# Patient Record
Sex: FEMALE | Race: BLACK OR AFRICAN AMERICAN | Employment: UNEMPLOYED | URBAN - METROPOLITAN AREA
[De-identification: names, ages, dates, MRNs, and addresses within clinical notes are randomized per-mention and may not be internally consistent; named-entity substitution may affect disease eponyms.]

---

## 2018-03-12 ENCOUNTER — OFFICE VISIT (OUTPATIENT)
Dept: FAMILY MEDICINE CLINIC | Age: 33
End: 2018-03-12

## 2018-03-12 VITALS
SYSTOLIC BLOOD PRESSURE: 112 MMHG | TEMPERATURE: 98.7 F | BODY MASS INDEX: 21 KG/M2 | DIASTOLIC BLOOD PRESSURE: 73 MMHG | RESPIRATION RATE: 16 BRPM | HEART RATE: 79 BPM | OXYGEN SATURATION: 98 % | HEIGHT: 59 IN | WEIGHT: 104.2 LBS

## 2018-03-12 DIAGNOSIS — Z87.59 HISTORY OF PREGNANCY INDUCED HYPERTENSION: ICD-10-CM

## 2018-03-12 DIAGNOSIS — M54.6 MIDLINE THORACIC BACK PAIN, UNSPECIFIED CHRONICITY: Primary | ICD-10-CM

## 2018-03-12 NOTE — MR AVS SNAPSHOT
[facility] 
 
 
 [address] [place] [address] [address] [address] 
[phone] Patient: Mark Palacios MRN: [id number] FDI:[id number] Visit Information Date & Time Provider Department Dept. Phone Encounter #  
 3/12/2018 11:00 AM Lawrence Arana NP [place] [id number] Follow-up Instructions Return in about 4 weeks (around 4/9/2018) for Well woman exam with PAP/CBE. Upcoming Health Maintenance Date Due DTaP/Tdap/Td series (1 - Tdap) 6/15/2008 PAP AKA CERVICAL CYTOLOGY 6/15/2008 Allergies as of 3/12/2018  Review Complete On: 3/12/2018 By: Lawrence Arana NP Severity Noted Reaction Type Reactions Pcn [Penicillins]  03/06/2012    Hives Current Immunizations  Never Reviewed No immunizations on file. Not reviewed this visit You Were Diagnosed With   
  
 Codes Comments Midline thoracic back pain, unspecified chronicity    -  Primary ICD-10-CM: M54.6 ICD-9-CM: 724.1 History of pregnancy induced hypertension     ICD-10-CM: Z87.59 
ICD-9-CM: V13.29 Vitals BP Pulse Temp Resp Height(growth percentile) Weight(growth percentile) 112/73 (BP 1 Location: Left arm, BP Patient Position: Sitting) 79 98.7 °F (37.1 °C) (Oral) 16 4' 11\" (1.499 m) 104 lb 3.2 oz (47.3 kg) LMP SpO2 BMI OB Status Smoking Status 03/11/2018 98% 21.05 kg/m2 Unknown Never Smoker BMI and BSA Data Body Mass Index Body Surface Area 21.05 kg/m 2 1.4 m 2 Preferred Pharmacy Pharmacy Name Phone Jules Slice [address] [facility], [address] [place] [phone] Your Updated Medication List  
  
Notice  As of 3/12/2018 11:00 AM  
 You have not been prescribed any medications. Follow-up Instructions Return in about 4 weeks (around 4/9/2018) for Well woman exam with PAP/CBE. To-Do List   
 03/12/2018 Lab:  LIPID PANEL   
  
 03/12/2018 Lab: METABOLIC PANEL, COMPREHENSIVE Patient Instructions May take over the counter ibuprofen 2-3 tablets every 8 hours as needed for back pain High Blood Pressure in Pregnancy: Care Instructions Your Care Instructions High blood pressure (hypertension) means that the force of blood against your artery walls is too strong. Mild high blood pressure during pregnancy is not usually dangerous. Your doctor will probably just want to watch you closely. But when blood pressure is very high, it can reduce oxygen to your baby. This can affect how well your baby grows. High blood pressure also means that you are at higher risk for: · Preeclampsia. This is a problem that includes high blood pressure and damage to your liver or kidneys. It can also reduce how much oxygen your baby gets. In some cases, it leads to eclampsia. Eclampsia causes seizures. · Placental abruption. This is a problem when the placenta separates from the uterus before birth. It prevents the baby from getting enough oxygen and nutrients. Sometimes it can cause death for the baby and the mother. To prevent problems for you or your baby, you will have to check your blood pressure often. You will do this until after your baby is born. If your blood pressure rises suddenly or is very high during your pregnancy, your doctor may prescribe medicines. They can usually control blood pressure. If your blood pressure affects your or your baby's health, your doctor may need to deliver your baby early. After your baby is born, your blood pressure will probably improve. But sometimes blood pressure problems continue after birth. Follow-up care is a key part of your treatment and safety. Be sure to make and go to all appointments, and call your doctor if you are having problems. It's also a good idea to know your test results and keep a list of the medicines you take. How can you care for yourself at home? · Take and write down your blood pressure at home if your doctor tells you to. · Take your medicines exactly as prescribed. Call your doctor if you think you are having a problem with your medicine. · Do not smoke. If you need help quitting, talk to your doctor about stop-smoking programs and medicines. These can increase your chances of quitting for good. · Do not gain too much weight during your pregnancy. Talk to your doctor about how much weight gain is healthy. · Eat a healthy diet. · If your doctor says it's okay, get regular exercise. Walking or swimming several times a week can be healthy for you and your baby. · Reduce stress, and find time to relax. This is very important if you continue to work or have a busy schedule. It's also important if you have small children at home. When should you call for help? Call 911 anytime you think you may need emergency care. For example, call if: 
? · You passed out (lost consciousness). ? · You have a seizure. ?Call your doctor now or seek immediate medical care if: 
? · You have symptoms of preeclampsia, such as: 
¨ Sudden swelling of your face, hands, or feet. ¨ New vision problems (such as dimness or blurring). ¨ A severe headache. ? · Your blood pressure is higher than it should be or rises suddenly. ? · You have new nausea or vomiting. ? · You think that you are in labor. ? · You have pain in your belly or pelvis. ? Watch closely for changes in your health, and be sure to contact your doctor if: 
? · You gain weight rapidly. Where can you learn more? Go to http://james-arcelia.info/. Enter [phone] in the search box to learn more about \"High Blood Pressure in Pregnancy: Care Instructions. \" Current as of: March 16, 2017 Content Version: 11.4 © 5904-9993 [employer].  Care instructions adapted under license by [employer] (which disclaims liability or warranty for this information). If you have questions about a medical condition or this instruction, always ask your healthcare professional. [address] any warranty or liability for your use of this information. DASH Diet: Care Instructions Your Care Instructions The DASH diet is an eating plan that can help lower your blood pressure. DASH stands for Dietary Approaches to Stop Hypertension. Hypertension is high blood pressure. The DASH diet focuses on eating foods that are high in calcium, potassium, and magnesium. These nutrients can lower blood pressure. The foods that are highest in these nutrients are fruits, vegetables, low-fat dairy products, nuts, seeds, and legumes. But taking calcium, potassium, and magnesium supplements instead of eating foods that are high in those nutrients does not have the same effect. The DASH diet also includes whole grains, fish, and poultry. The DASH diet is one of several lifestyle changes your doctor may recommend to lower your high blood pressure. Your doctor may also want you to decrease the amount of sodium in your diet. Lowering sodium while following the DASH diet can lower blood pressure even further than just the DASH diet alone. Follow-up care is a key part of your treatment and safety. Be sure to make and go to all appointments, and call your doctor if you are having problems. It's also a good idea to know your test results and keep a list of the medicines you take. How can you care for yourself at home? Following the DASH diet · Eat 4 to 5 servings of fruit each day. A serving is 1 medium-sized piece of fruit, ½ cup chopped or canned fruit, 1/4 cup dried fruit, or 4 ounces (½ cup) of fruit juice. Choose fruit more often than fruit juice. · Eat 4 to 5 servings of vegetables each day.  A serving is 1 cup of lettuce or raw leafy vegetables, ½ cup of chopped or cooked vegetables, or 4 ounces (½ cup) of vegetable juice. Choose vegetables more often than vegetable juice. · Get 2 to 3 servings of low-fat and fat-free dairy each day. A serving is 8 ounces of milk, 1 cup of yogurt, or 1 ½ ounces of cheese. · Eat 6 to 8 servings of grains each day. A serving is 1 slice of bread, 1 ounce of dry cereal, or ½ cup of cooked rice, pasta, or cooked cereal. Try to choose whole-grain products as much as possible. · Limit lean meat, poultry, and fish to 2 servings each day. A serving is 3 ounces, about the size of a deck of cards. · Eat 4 to 5 servings of nuts, seeds, and legumes (cooked dried beans, lentils, and split peas) each week. A serving is 1/3 cup of nuts, 2 tablespoons of seeds, or ½ cup of cooked beans or peas. · Limit fats and oils to 2 to 3 servings each day. A serving is 1 teaspoon of vegetable oil or 2 tablespoons of salad dressing. · Limit sweets and added sugars to 5 servings or less a week. A serving is 1 tablespoon jelly or jam, ½ cup sorbet, or 1 cup of lemonade. · Eat less than 2,300 milligrams (mg) of sodium a day. If you limit your sodium to 1,500 mg a day, you can lower your blood pressure even more. Tips for success · Start small. Do not try to make dramatic changes to your diet all at once. You might feel that you are missing out on your favorite foods and then be more likely to not follow the plan. Make small changes, and stick with them. Once those changes become habit, add a few more changes. · Try some of the following: ¨ Make it a goal to eat a fruit or vegetable at every meal and at snacks. This will make it easy to get the recommended amount of fruits and vegetables each day. ¨ Try yogurt topped with fruit and nuts for a snack or healthy dessert. ¨ Add lettuce, tomato, cucumber, and onion to sandwiches. ¨ Combine a ready-made pizza crust with low-fat mozzarella cheese and lots of vegetable toppings.  Try using tomatoes, squash, spinach, broccoli, carrots, cauliflower, and onions. ¨ Have a variety of cut-up vegetables with a low-fat dip as an appetizer instead of chips and dip. ¨ Sprinkle sunflower seeds or chopped almonds over salads. Or try adding chopped walnuts or almonds to cooked vegetables. ¨ Try some vegetarian meals using beans and peas. Add garbanzo or kidney beans to salads. Make burritos and tacos with mashed russell beans or black beans. Where can you learn more? Go to http://james"[employer]arcelia.info/. Enter K389 in the search box to learn more about \"DASH Diet: Care Instructions. \" Current as of: September 21, 2016 Content Version: 11.4 © 2341-9683 [employer]. Care instructions adapted under license by [employer] (which disclaims liability or warranty for this information). If you have questions about a medical condition or this instruction, always ask your healthcare professional. [place] [address] any warranty or liability for your use of this information. Introducing [facility]! Melisas Weathers introduces Barnacle patient portal. Now you can access parts of your medical record, email your doctor's office, and request medication refills online. 1. In your internet browser, go to [email or web address] 2. Click on the First Time User? Click Here link in the Sign In box. You will see the New Member Sign Up page. 3. Enter your Barnacle Access Code exactly as it appears below. You will not need to use this code after youve completed the sign-up process. If you do not sign up before the expiration date, you must request a new code. · Barnacle Access Code: [email or web address] Expires: 6/10/2018 11:00 AM 
 
4. Enter the last four digits of your Social Security Number (xxxx) and Date of Birth (mm/dd/yyyy) as indicated and click Submit. You will be taken to the next sign-up page. 5. Create a Barnacle ID.  This will be your Barnacle login ID and cannot be changed, so think of one that is secure and easy to remember. 6. Create a Branded Reality password. You can change your password at any time. 7. Enter your Password Reset Question and Answer. This can be used at a later time if you forget your password. 8. Enter your e-mail address. You will receive e-mail notification when new information is available in [address] 9. Click Sign Up. You can now view and download portions of your medical record. 10. Click the Download Summary menu link to download a portable copy of your medical information. If you have questions, please visit the Frequently Asked Questions section of the Branded Reality website. Remember, Branded Reality is NOT to be used for urgent needs. For medical emergencies, dial 911. Now available from your iPhone and Android! Please provide this summary of care documentation to your next provider. Your primary care clinician is listed as NONE. If you have any questions after today's visit, please call [phone].

## 2018-03-12 NOTE — PROGRESS NOTES
HISTORY OF PRESENT ILLNESS  Jeanette Mobley is a 29 y.o. female. HPI Comments: Presents today establish care. Reports she does have history of lower back pain at her epideral site. Reports pain is intermittent, currently denies. Physical   The history is provided by the patient. This is a chronic problem. Episode frequency: intermittently. Pertinent negatives include no chest pain, no abdominal pain, no headaches and no shortness of breath. Nothing aggravates the symptoms. Relieved by: taking over the counter ibuprofen prn with some relief. The treatment provided mild relief. Cardiovascular Review:  The patient has history of PIH and also states she has intermittent back pain at the site of her epidural.  Diet and Lifestyle: not attempting to follow a low sodium diet, sedentary  Home BP Monitoring: is not measured at home. Pertinent ROS: no TIA's, no chest pain on exertion, no dyspnea on exertion, no swelling of ankles, currently not taking any medication. Patient Active Problem List   Diagnosis Code    PIH (pregnancy induced hypertension) O13.9     Patient Active Problem List    Diagnosis Date Noted    PIH (pregnancy induced hypertension) 2013       Allergies   Allergen Reactions    Pcn [Penicillins] Hives     History reviewed. No pertinent past medical history.   Past Surgical History:   Procedure Laterality Date    HX GYN           Family History   Problem Relation Age of Onset    Asthma Paternal Aunt     Asthma Paternal Grandmother     Hypertension Mother     Hypertension Father      Social History   Substance Use Topics    Smoking status: Never Smoker    Smokeless tobacco: Never Used    Alcohol use No      Lab Results  Component Value Date/Time   WBC 6.2 2016 02:20 AM   HGB 13.0 2016 02:20 AM   HCT 37.8 2016 02:20 AM   PLATELET 489 3140 02:20 AM   MCV 78.1 2016 02:20 AM     Lab Results   Component Value Date/Time    Sodium 139 2016 02:20 AM    Potassium 3.7 02/03/2016 02:20 AM    Chloride 106 02/03/2016 02:20 AM    CO2 25 02/03/2016 02:20 AM    Anion gap 8 02/03/2016 02:20 AM    Glucose 97 02/03/2016 02:20 AM    BUN 14 02/03/2016 02:20 AM    Creatinine 0.68 02/03/2016 02:20 AM    BUN/Creatinine ratio 21 (H) 02/03/2016 02:20 AM    GFR est AA >60 02/03/2016 02:20 AM    GFR est non-AA >60 02/03/2016 02:20 AM    Calcium 9.1 02/03/2016 02:20 AM    Bilirubin, total 0.7 02/03/2016 02:20 AM    ALT (SGPT) 23 02/03/2016 02:20 AM    AST (SGOT) 15 02/03/2016 02:20 AM    Alk. phosphatase 95 02/03/2016 02:20 AM    Protein, total 8.0 02/03/2016 02:20 AM    Albumin 3.7 02/03/2016 02:20 AM    Globulin 4.3 (H) 02/03/2016 02:20 AM    A-G Ratio 0.9 02/03/2016 02:20 AM            Review of Systems   Constitutional: Negative for chills and fever. HENT: Negative. Eyes: Negative for blurred vision. Respiratory: Negative for cough, shortness of breath and wheezing. Cardiovascular: Negative for chest pain, palpitations and leg swelling. Gastrointestinal: Negative for abdominal pain and nausea. Genitourinary: Negative for dysuria, frequency and urgency. Musculoskeletal: Positive for back pain (lower back). Skin: Negative for itching and rash. Neurological: Negative for dizziness and headaches. Psychiatric/Behavioral: Negative for depression. The patient is not nervous/anxious. Visit Vitals    /73 (BP 1 Location: Left arm, BP Patient Position: Sitting)    Pulse 79    Temp 98.7 °F (37.1 °C) (Oral)    Resp 16    Ht 4' 11\" (1.499 m)    Wt 104 lb 3.2 oz (47.3 kg)    LMP 03/11/2018    SpO2 98%    BMI 21.05 kg/m2     Physical Exam   Constitutional: She is oriented to person, place, and time. She appears well-developed and well-nourished. HENT:   Head: Normocephalic and atraumatic. Eyes: EOM are normal. Pupils are equal, round, and reactive to light. Neck: Normal range of motion. Neck supple.    Cardiovascular: Normal rate, regular rhythm, normal heart sounds and intact distal pulses. Exam reveals no gallop and no friction rub. No murmur heard. Pulmonary/Chest: Effort normal and breath sounds normal. She has no wheezes. She has no rales. Abdominal: Soft. Bowel sounds are normal. She exhibits no distension. There is no tenderness. Musculoskeletal: Normal range of motion. She exhibits no edema or tenderness. Lymphadenopathy:     She has no cervical adenopathy. Neurological: She is alert and oriented to person, place, and time. She has normal reflexes. Skin: Skin is warm and dry. No rash noted. No erythema. Psychiatric: She has a normal mood and affect. Her behavior is normal. Thought content normal.   Vitals reviewed. ASSESSMENT and PLAN  Diagnoses and all orders for this visit:    1. Midline thoracic back pain, unspecified chronicity    2. History of pregnancy induced hypertension  -     METABOLIC PANEL, COMPREHENSIVE; Future  -     LIPID PANEL; Future    May use heat to back in 15-20 min increments to help with pain and also use otc ibuprofen 2-3 tabs three times a day as needed for back pain  I have discussed the diagnosis with the patient and the intended plan as seen in the above orders. The patient has received an after-visit summary and questions were answered concerning future plans. I have discussed medication side effects and warnings with the patient as well. Patient agreeable with above plan and verbalizes understanding. Follow-up Disposition:  Return in about 4 weeks (around 4/9/2018) for Well woman exam with PAP/CBE.

## 2018-03-12 NOTE — PATIENT INSTRUCTIONS
May take over the counter ibuprofen 2-3 tablets every 8 hours as needed for back pain  High Blood Pressure in Pregnancy: Care Instructions  Your Care Instructions    High blood pressure (hypertension) means that the force of blood against your artery walls is too strong. Mild high blood pressure during pregnancy is not usually dangerous. Your doctor will probably just want to watch you closely. But when blood pressure is very high, it can reduce oxygen to your baby. This can affect how well your baby grows. High blood pressure also means that you are at higher risk for:  · Preeclampsia. This is a problem that includes high blood pressure and damage to your liver or kidneys. It can also reduce how much oxygen your baby gets. In some cases, it leads to eclampsia. Eclampsia causes seizures. · Placental abruption. This is a problem when the placenta separates from the uterus before birth. It prevents the baby from getting enough oxygen and nutrients. Sometimes it can cause death for the baby and the mother. To prevent problems for you or your baby, you will have to check your blood pressure often. You will do this until after your baby is born. If your blood pressure rises suddenly or is very high during your pregnancy, your doctor may prescribe medicines. They can usually control blood pressure. If your blood pressure affects your or your baby's health, your doctor may need to deliver your baby early. After your baby is born, your blood pressure will probably improve. But sometimes blood pressure problems continue after birth. Follow-up care is a key part of your treatment and safety. Be sure to make and go to all appointments, and call your doctor if you are having problems. It's also a good idea to know your test results and keep a list of the medicines you take. How can you care for yourself at home? · Take and write down your blood pressure at home if your doctor tells you to.   · Take your medicines exactly as prescribed. Call your doctor if you think you are having a problem with your medicine. · Do not smoke. If you need help quitting, talk to your doctor about stop-smoking programs and medicines. These can increase your chances of quitting for good. · Do not gain too much weight during your pregnancy. Talk to your doctor about how much weight gain is healthy. · Eat a healthy diet. · If your doctor says it's okay, get regular exercise. Walking or swimming several times a week can be healthy for you and your baby. · Reduce stress, and find time to relax. This is very important if you continue to work or have a busy schedule. It's also important if you have small children at home. When should you call for help? Call 911 anytime you think you may need emergency care. For example, call if:  ? · You passed out (lost consciousness). ? · You have a seizure. ?Call your doctor now or seek immediate medical care if:  ? · You have symptoms of preeclampsia, such as:  ¨ Sudden swelling of your face, hands, or feet. ¨ New vision problems (such as dimness or blurring). ¨ A severe headache. ? · Your blood pressure is higher than it should be or rises suddenly. ? · You have new nausea or vomiting. ? · You think that you are in labor. ? · You have pain in your belly or pelvis. ? Watch closely for changes in your health, and be sure to contact your doctor if:  ? · You gain weight rapidly. Where can you learn more? Go to http://james-arcelia.info/. Enter [phone] in the search box to learn more about \"High Blood Pressure in Pregnancy: Care Instructions. \"  Current as of: March 16, 2017  Content Version: 11.4  © 1730-2979 [employer]. Care instructions adapted under license by [employer] (which disclaims liability or warranty for this information).  If you have questions about a medical condition or this instruction, always ask your healthcare professional. [employer], [facility] disclaims any warranty or liability for your use of this information. DASH Diet: Care Instructions  Your Care Instructions    The DASH diet is an eating plan that can help lower your blood pressure. DASH stands for Dietary Approaches to Stop Hypertension. Hypertension is high blood pressure. The DASH diet focuses on eating foods that are high in calcium, potassium, and magnesium. These nutrients can lower blood pressure. The foods that are highest in these nutrients are fruits, vegetables, low-fat dairy products, nuts, seeds, and legumes. But taking calcium, potassium, and magnesium supplements instead of eating foods that are high in those nutrients does not have the same effect. The DASH diet also includes whole grains, fish, and poultry. The DASH diet is one of several lifestyle changes your doctor may recommend to lower your high blood pressure. Your doctor may also want you to decrease the amount of sodium in your diet. Lowering sodium while following the DASH diet can lower blood pressure even further than just the DASH diet alone. Follow-up care is a key part of your treatment and safety. Be sure to make and go to all appointments, and call your doctor if you are having problems. It's also a good idea to know your test results and keep a list of the medicines you take. How can you care for yourself at home? Following the DASH diet  · Eat 4 to 5 servings of fruit each day. A serving is 1 medium-sized piece of fruit, ½ cup chopped or canned fruit, 1/4 cup dried fruit, or 4 ounces (½ cup) of fruit juice. Choose fruit more often than fruit juice. · Eat 4 to 5 servings of vegetables each day. A serving is 1 cup of lettuce or raw leafy vegetables, ½ cup of chopped or cooked vegetables, or 4 ounces (½ cup) of vegetable juice. Choose vegetables more often than vegetable juice. · Get 2 to 3 servings of low-fat and fat-free dairy each day.  A serving is 8 ounces of milk, 1 cup of yogurt, or 1 ½ ounces of cheese. · Eat 6 to 8 servings of grains each day. A serving is 1 slice of bread, 1 ounce of dry cereal, or ½ cup of cooked rice, pasta, or cooked cereal. Try to choose whole-grain products as much as possible. · Limit lean meat, poultry, and fish to 2 servings each day. A serving is 3 ounces, about the size of a deck of cards. · Eat 4 to 5 servings of nuts, seeds, and legumes (cooked dried beans, lentils, and split peas) each week. A serving is 1/3 cup of nuts, 2 tablespoons of seeds, or ½ cup of cooked beans or peas. · Limit fats and oils to 2 to 3 servings each day. A serving is 1 teaspoon of vegetable oil or 2 tablespoons of salad dressing. · Limit sweets and added sugars to 5 servings or less a week. A serving is 1 tablespoon jelly or jam, ½ cup sorbet, or 1 cup of lemonade. · Eat less than 2,300 milligrams (mg) of sodium a day. If you limit your sodium to 1,500 mg a day, you can lower your blood pressure even more. Tips for success  · Start small. Do not try to make dramatic changes to your diet all at once. You might feel that you are missing out on your favorite foods and then be more likely to not follow the plan. Make small changes, and stick with them. Once those changes become habit, add a few more changes. · Try some of the following:  ¨ Make it a goal to eat a fruit or vegetable at every meal and at snacks. This will make it easy to get the recommended amount of fruits and vegetables each day. ¨ Try yogurt topped with fruit and nuts for a snack or healthy dessert. ¨ Add lettuce, tomato, cucumber, and onion to sandwiches. ¨ Combine a ready-made pizza crust with low-fat mozzarella cheese and lots of vegetable toppings. Try using tomatoes, squash, spinach, broccoli, carrots, cauliflower, and onions. ¨ Have a variety of cut-up vegetables with a low-fat dip as an appetizer instead of chips and dip. ¨ Sprinkle sunflower seeds or chopped almonds over salads.  Or try adding chopped walnuts or almonds to cooked vegetables. ¨ Try some vegetarian meals using beans and peas. Add garbanzo or kidney beans to salads. Make burritos and tacos with mashed russell beans or black beans. Where can you learn more? Go to http://james-arcelia.info/. Enter T401 in the search box to learn more about \"DASH Diet: Care Instructions. \"  Current as of: September 21, 2016  Content Version: 11.4  © 0649-7481 [employer]. Care instructions adapted under license by [employer] (which disclaims liability or warranty for this information). If you have questions about a medical condition or this instruction, always ask your healthcare professional. [address] any warranty or liability for your use of this information.

## 2018-03-12 NOTE — PROGRESS NOTES
Chief Complaint   Patient presents with   [address] [facility] [address] Hypertension     Patient is here as a New patient. Pt sts she has been having back pain as well.

## 2018-03-13 LAB
A-G RATIO,AGRAT: 1.3 RATIO (ref 1.1–2.6)
ALBUMIN SERPL-MCNC: 4.4 G/DL (ref 3.5–5)
ALP SERPL-CCNC: 82 U/L (ref 25–115)
ALT SERPL-CCNC: 20 U/L (ref 5–40)
ANION GAP SERPL CALC-SCNC: 18 MMOL/L
AST SERPL W P-5'-P-CCNC: 25 U/L (ref 10–37)
BILIRUB SERPL-MCNC: 0.5 MG/DL (ref 0.2–1.2)
BUN SERPL-MCNC: 13 MG/DL (ref 6–22)
CALCIUM SERPL-MCNC: 9.2 MG/DL (ref 8.4–10.5)
CHLORIDE SERPL-SCNC: 104 MMOL/L (ref 98–110)
CHOLEST SERPL-MCNC: 196 MG/DL (ref 110–200)
CO2 SERPL-SCNC: 20 MMOL/L (ref 20–32)
CREAT SERPL-MCNC: 0.5 MG/DL (ref 0.5–1.2)
GFRAA, 66117: >60
GFRNA, 66118: >60
GLOBULIN,GLOB: 3.4 G/DL (ref 2–4)
GLUCOSE SERPL-MCNC: 75 MG/DL (ref 70–99)
HDLC SERPL-MCNC: 2.7 MG/DL (ref 0–5)
HDLC SERPL-MCNC: 72 MG/DL (ref 40–59)
LDLC SERPL CALC-MCNC: 114 MG/DL (ref 50–99)
POTASSIUM SERPL-SCNC: 4.3 MMOL/L (ref 3.5–5.5)
PROT SERPL-MCNC: 7.8 G/DL (ref 6.4–8.3)
SODIUM SERPL-SCNC: 142 MMOL/L (ref 133–145)
TRIGL SERPL-MCNC: 51 MG/DL (ref 40–149)
VLDLC SERPL CALC-MCNC: 10 MG/DL (ref 8–30)

## 2018-04-05 NOTE — PROGRESS NOTES
Please advise patient her LDL(bad cholesterol) is slightly elevated. She will need to decrease high cholesterol/fatty food intake.   All other labs are normal.  Thanks, [facility]

## 2018-04-06 NOTE — PROGRESS NOTES
I called Pt in regards to Lab results. Informed Pt that her LDL was slightly elevated and she will need to Decrease Foods high in fat and cholesterol. Pt verbalized understanding.

## 2018-06-06 ENCOUNTER — HOSPITAL ENCOUNTER (EMERGENCY)
Age: 33
Discharge: HOME | End: 2018-06-06
Attending: EMERGENCY MEDICINE
Payer: MEDICAID

## 2018-06-06 ENCOUNTER — APPOINTMENT (OUTPATIENT)
Dept: CT IMAGING | Age: 33
End: 2018-06-06
Attending: PHYSICIAN ASSISTANT
Payer: MEDICAID

## 2018-06-06 VITALS
SYSTOLIC BLOOD PRESSURE: 124 MMHG | RESPIRATION RATE: 18 BRPM | TEMPERATURE: 100.2 F | HEART RATE: 97 BPM | DIASTOLIC BLOOD PRESSURE: 95 MMHG | OXYGEN SATURATION: 99 %
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
## 2018-06-06 DIAGNOSIS — S00.511A LIP ABRASION, INITIAL ENCOUNTER: Primary | ICD-10-CM

## 2018-06-06 DIAGNOSIS — S09.90XA INJURY OF HEAD, INITIAL ENCOUNTER: ICD-10-CM

## 2018-06-06 PROCEDURE — 99283 EMERGENCY DEPT VISIT LOW MDM: CPT

## 2018-06-06 PROCEDURE — 70450 CT HEAD/BRAIN W/O DYE: CPT

## 2018-06-06 RX ORDER — IBUPROFEN 800 MG/1
800 TABLET ORAL
Qty: 20 TAB | Refills: 0 | Status: SHIPPED | OUTPATIENT
Start: 2018-06-06 | End: 2018-06-13

## 2018-06-06 RX ORDER — METHOCARBAMOL 500 MG/1
500 TABLET, FILM COATED ORAL 4 TIMES DAILY
Qty: 12 TAB | Refills: 0 | Status: SHIPPED | OUTPATIENT
Start: 2018-06-06 | End: 2019-03-20
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
## 2019-03-20 ENCOUNTER — OFFICE VISIT (OUTPATIENT)
Dept: FAMILY MEDICINE CLINIC | Age: 34
End: 2019-03-20

## 2019-03-20 VITALS
SYSTOLIC BLOOD PRESSURE: 130 MMHG | WEIGHT: 99 LBS | RESPIRATION RATE: 16 BRPM | BODY MASS INDEX: 19.96 KG/M2 | OXYGEN SATURATION: 99 % | TEMPERATURE: 98.1 F | HEIGHT: 59 IN | HEART RATE: 85 BPM | DIASTOLIC BLOOD PRESSURE: 80 MMHG

## 2019-03-20 DIAGNOSIS — R07.89 ATYPICAL CHEST PAIN: Primary | ICD-10-CM

## 2019-03-20 RX ORDER — NAPROXEN 500 MG/1
TABLET ORAL
Qty: 20 TAB | Refills: 0 | Status: SHIPPED | OUTPATIENT
Start: 2019-03-20

## 2019-03-20 NOTE — PROGRESS NOTES
Chief Complaint Patient presents with  Chest Pain Pt preferred language for health care discussion is english. Is someone accompanying this pt? no 
 
Is the patient using any DME equipment during [address]? no 
 
Depression Screening: 
3 most recent PHQ Screens 3/12/2018 Little interest or pleasure in doing things Not at all Feeling down, depressed, irritable, or hopeless Not at all Total Score PHQ 2 0 Learning Assessment: 
Learning Assessment 3/12/2018 6/24/2014 PRIMARY LEARNER Patient Patient HIGHEST LEVEL OF EDUCATION - PRIMARY LEARNER  GRADUATED HIGH SCHOOL OR GED GRADUATED HIGH SCHOOL OR GED  
BARRIERS PRIMARY LEARNER - NONE PRIMARY LANGUAGE ENGLISH ENGLISH  
LEARNER PREFERENCE PRIMARY READING READING  
ANSWERED BY SELF patient RELATIONSHIP SELF SELF Health Maintenance reviewed and discussed per provider. Yes Advance Directive: 1. Do you have an advance directive in place? Patient Reply:no 2. If not, would you like material regarding how to put one in place? Patient Reply: no 
 
Coordination of Care: 1. Have you been to the ER, urgent care clinic since your last visit? Hospitalized since your last visit? no 
 
2. Have you seen or consulted any other health care providers outside of the [address] [place] [address] since your last visit? Include any pap smears or colon screening. no 
 
Provider prefers to do his own med reconciliation

## 2019-03-20 NOTE — PROGRESS NOTES
HPI:  
3-4 mos of fleeting nonexertional chest pain particularly when bending, moving arms; episodes last seconds w/o cough, fever, abd pain LMP now ROS is otherwise negative. History reviewed. No pertinent past medical history. Past Surgical History:  
Procedure Laterality Date  HX GYN    Social History Socioeconomic History  Marital status: SINGLE Spouse name: Not on file  Number of children: Not on file  Years of education: Not on file  Highest education level: Not on file Occupational History  Not on file Social Needs  Financial resource strain: Not on file  Food insecurity:  
  Worry: Not on file Inability: Not on file  Transportation needs:  
  Medical: Not on file Non-medical: Not on file Tobacco Use  Smoking status: Never Smoker  Smokeless tobacco: Never Used Substance and Sexual Activity  Alcohol use: No  
 Drug use: No  
 Sexual activity: Yes Birth control/protection: None Lifestyle  Physical activity:  
  Days per week: Not on file Minutes per session: Not on file  Stress: Not on file Relationships  Social connections:  
  Talks on phone: Not on file Gets together: Not on file Attends Gnosticist service: Not on file Active member of club or organization: Not on file Attends meetings of clubs or organizations: Not on file Relationship status: Not on file  Intimate partner violence:  
  Fear of current or ex partner: Not on file Emotionally abused: Not on file Physically abused: Not on file Forced sexual activity: Not on file Other Topics Concern  Not on file Social History Narrative  Not on file Allergies Allergen Reactions  Pcn [Penicillins] Hives Family History Problem Relation Age of Onset  Asthma Paternal Aunt  Asthma Paternal Grandmother  Hypertension Mother  Hypertension Father Meds: none Visit Vitals /80 (BP 1 Location: Right arm, BP Patient Position: Sitting) Pulse 85 Temp 98.1 °F (36.7 °C) (Tympanic) Resp 16 Ht 4' 11\" (1.499 m) Wt 99 lb (44.9 kg) SpO2 99% BMI 20.00 kg/m² PE Well nourished in NAD HEENT:  OP: clear. Neck: supple w/o mass Chest: clear. +Reproducible pain (B) anterior chest wall CV: RRR w/o m,r,g; pulses intact. Abd: soft, NT, w/o HSM or mass. Ext: w/o edema. Neuro: NF. Assessment and Plan Encounter Diagnoses Name Primary?  Atypical chest pain Yes [facility] CP - naprosyn 500 mg bid with food for 10 days; f/u worsening or unimproved 7 days OV 3 mos or prn I have explained plan to patient and the patient verbalizes understanding

## 2019-03-20 NOTE — PATIENT INSTRUCTIONS
Musculoskeletal Chest Pain: Care Instructions Your Care Instructions Chest pain is not always a sign that something is wrong with your heart or that you have another serious problem. The doctor thinks your chest pain is caused by strained muscles or ligaments, inflamed chest cartilage, or another problem in your chest, rather than by your heart. You may need more tests to find the cause of your chest pain. Follow-up care is a key part of your treatment and safety. Be sure to make and go to all appointments, and call your doctor if you are having problems. It's also a good idea to know your test results and keep a list of the medicines you take. How can you care for yourself at home? · Take pain medicines exactly as directed. ? If the doctor gave you a prescription medicine for pain, take it as prescribed. ? If you are not taking a prescription pain medicine, ask your doctor if you can take an over-the-counter medicine. · Rest and protect the sore area. · Stop, change, or take a break from any activity that may be causing your pain or soreness. · Put ice or a cold pack on the sore area for 10 to 20 minutes at a time. Try to do this every 1 to 2 hours for the next 3 days (when you are awake) or until the swelling goes down. Put a thin cloth between the ice and your skin. · After 2 or 3 days, apply a heating pad set on low or a warm cloth to the area that hurts. Some doctors suggest that you go back and forth between hot and cold. · Do not wrap or tape your ribs for support. This may cause you to take smaller breaths, which could increase your risk of lung problems. · Mentholated creams such as Bengay or Icy Hot may soothe sore muscles. Follow the instructions on the package. · Follow your doctor's instructions for exercising. · Gentle stretching and massage may help you get better faster.  Stretch slowly to the point just before pain begins, and hold the stretch for at least 15 to 30 seconds. Do this 3 or 4 times a day. Stretch just after you have applied heat. · As your pain gets better, slowly return to your normal activities. Any increased pain may be a sign that you need to rest a while longer. When should you call for help? Call 911 anytime you think you may need emergency care. For example, call if: 
  · You have chest pain or pressure. This may occur with: ? Sweating. ? Shortness of breath. ? Nausea or vomiting. ? Pain that spreads from the chest to the neck, jaw, or one or both shoulders or arms. ? Dizziness or lightheadedness. ? A fast or uneven pulse. After calling 911, chew 1 adult-strength aspirin. Wait for an ambulance. Do not try to drive yourself.  
  · You have sudden chest pain and shortness of breath, or you cough up blood.  
 Call your doctor now or seek immediate medical care if: 
  · You have any trouble breathing.  
  · Your chest pain gets worse.  
  · Your chest pain occurs consistently with exercise and is relieved by rest.  
 Watch closely for changes in your health, and be sure to contact your doctor if: 
  · Your chest pain does not get better after 1 week. Where can you learn more? Go to http://james-arcelia.info/. Enter V293 in the search box to learn more about \"Musculoskeletal Chest Pain: Care Instructions. \" Current as of: September 23, 2018 Content Version: 11.9 © 5689-5222 [employer]. Care instructions adapted under license by [employer] (which disclaims liability or warranty for this information). If you have questions about a medical condition or this instruction, always ask your healthcare professional. [place] [address] any warranty or liability for your use of this information.

## 2019-10-22 ENCOUNTER — HOSPITAL ENCOUNTER (EMERGENCY)
Age: 34
Discharge: HOME | End: 2019-10-22
Attending: EMERGENCY MEDICINE
Payer: MEDICAID

## 2019-10-22 ENCOUNTER — APPOINTMENT (OUTPATIENT)
Dept: CT IMAGING | Age: 34
End: 2019-10-22
Attending: EMERGENCY MEDICINE
Payer: MEDICAID

## 2019-10-22 VITALS
OXYGEN SATURATION: 100 % | RESPIRATION RATE: 18 BRPM | SYSTOLIC BLOOD PRESSURE: 124 MMHG | DIASTOLIC BLOOD PRESSURE: 81 MMHG | TEMPERATURE: 99 F | HEART RATE: 86 BPM

## 2019-10-22 DIAGNOSIS — R51.9 ACUTE NONINTRACTABLE HEADACHE, UNSPECIFIED HEADACHE TYPE: Primary | ICD-10-CM

## 2019-10-22 LAB
ALBUMIN SERPL-MCNC: 3.6 G/DL (ref 3.4–5)
ALBUMIN/GLOB SERPL: 0.9 {RATIO} (ref 0.8–1.7)
ALP SERPL-CCNC: 92 U/L (ref 45–117)
ALT SERPL-CCNC: 36 U/L (ref 13–56)
ANION GAP SERPL CALC-SCNC: 4 MMOL/L (ref 3–18)
AST SERPL-CCNC: 32 U/L (ref 10–38)
BASOPHILS # BLD: 0 K/UL (ref 0–0.1)
BASOPHILS NFR BLD: 1 % (ref 0–2)
BILIRUB SERPL-MCNC: 0.6 MG/DL (ref 0.2–1)
BUN SERPL-MCNC: 10 MG/DL (ref 7–18)
BUN/CREAT SERPL: 14 (ref 12–20)
CALCIUM SERPL-MCNC: 8.6 MG/DL (ref 8.5–10.1)
CHLORIDE SERPL-SCNC: 109 MMOL/L (ref 100–111)
CO2 SERPL-SCNC: 27 MMOL/L (ref 21–32)
CREAT SERPL-MCNC: 0.7 MG/DL (ref 0.6–1.3)
DIFFERENTIAL METHOD BLD: ABNORMAL
EOSINOPHIL # BLD: 0.5 K/UL (ref 0–0.4)
EOSINOPHIL NFR BLD: 7 % (ref 0–5)
ERYTHROCYTE [DISTWIDTH] IN BLOOD BY AUTOMATED COUNT: 13 % (ref 11.6–14.5)
GLOBULIN SER CALC-MCNC: 4.1 G/DL (ref 2–4)
GLUCOSE SERPL-MCNC: 94 MG/DL (ref 74–99)
HCG SERPL QL: NEGATIVE
HCT VFR BLD AUTO: 37 % (ref 35–45)
HGB BLD-MCNC: 12.6 G/DL (ref 12–16)
LYMPHOCYTES # BLD: 2 K/UL (ref 0.9–3.6)
LYMPHOCYTES NFR BLD: 32 % (ref 21–52)
MCH RBC QN AUTO: 27.6 PG (ref 24–34)
MCHC RBC AUTO-ENTMCNC: 34.1 G/DL (ref 31–37)
MCV RBC AUTO: 81.1 FL (ref 74–97)
MONOCYTES # BLD: 0.5 K/UL (ref 0.05–1.2)
MONOCYTES NFR BLD: 8 % (ref 3–10)
NEUTS SEG # BLD: 3.4 K/UL (ref 1.8–8)
NEUTS SEG NFR BLD: 52 % (ref 40–73)
PLATELET # BLD AUTO: 300 K/UL (ref 135–420)
PMV BLD AUTO: 9.9 FL (ref 9.2–11.8)
POTASSIUM SERPL-SCNC: 3.6 MMOL/L (ref 3.5–5.5)
PROT SERPL-MCNC: 7.7 G/DL (ref 6.4–8.2)
RBC # BLD AUTO: 4.56 M/UL (ref 4.2–5.3)
SODIUM SERPL-SCNC: 140 MMOL/L (ref 136–145)
WBC # BLD AUTO: 6.5 K/UL (ref 4.6–13.2)

## 2019-10-22 PROCEDURE — 74011250637 HC RX REV CODE- 250/637: Performed by: EMERGENCY MEDICINE

## 2019-10-22 PROCEDURE — 99284 EMERGENCY DEPT VISIT MOD MDM: CPT

## 2019-10-22 PROCEDURE — 85025 COMPLETE CBC W/AUTO DIFF WBC: CPT

## 2019-10-22 PROCEDURE — 74011250636 HC RX REV CODE- 250/636: Performed by: EMERGENCY MEDICINE

## 2019-10-22 PROCEDURE — 96374 THER/PROPH/DIAG INJ IV PUSH: CPT

## 2019-10-22 PROCEDURE — 93005 ELECTROCARDIOGRAM TRACING: CPT

## 2019-10-22 PROCEDURE — 84703 CHORIONIC GONADOTROPIN ASSAY: CPT

## 2019-10-22 PROCEDURE — 70450 CT HEAD/BRAIN W/O DYE: CPT

## 2019-10-22 PROCEDURE — 80053 COMPREHEN METABOLIC PANEL: CPT

## 2019-10-22 PROCEDURE — 96375 TX/PRO/DX INJ NEW DRUG ADDON: CPT

## 2019-10-22 RX ORDER — PROCHLORPERAZINE EDISYLATE 5 MG/ML
10 INJECTION INTRAMUSCULAR; INTRAVENOUS
Status: COMPLETED | OUTPATIENT
Start: 2019-10-22 | End: 2019-10-22

## 2019-10-22 RX ORDER — DIPHENHYDRAMINE HYDROCHLORIDE 50 MG/ML
12.5 INJECTION, SOLUTION INTRAMUSCULAR; INTRAVENOUS
Status: COMPLETED | OUTPATIENT
Start: 2019-10-22 | End: 2019-10-22

## 2019-10-22 RX ADMIN — DIPHENHYDRAMINE HYDROCHLORIDE 12.5 MG: 50 INJECTION INTRAMUSCULAR; INTRAVENOUS at 20:57

## 2019-10-22 RX ADMIN — SODIUM CHLORIDE 500 ML: 900 INJECTION, SOLUTION INTRAVENOUS at 20:58

## 2019-10-22 RX ADMIN — ACETAMINOPHEN ORAL SOLUTION 650 MG: 650 SOLUTION ORAL at 20:57

## 2019-10-22 RX ADMIN — PROCHLORPERAZINE EDISYLATE 10 MG: 5 INJECTION INTRAMUSCULAR; INTRAVENOUS at 20:57

## 2019-10-22 NOTE — ED TRIAGE NOTES
Patient brought by medic. Patient c/o of headache and chest pain. She reports taking 160mg of tylenol for menstrual cramps and developed a headache.  She reports chest pain under left breast.

## 2019-10-23 LAB
ATRIAL RATE: 87 BPM
CALCULATED P AXIS, ECG09: 44 DEGREES
CALCULATED R AXIS, ECG10: 73 DEGREES
CALCULATED T AXIS, ECG11: 45 DEGREES
DIAGNOSIS, 93000: NORMAL
P-R INTERVAL, ECG05: 138 MS
Q-T INTERVAL, ECG07: 334 MS
QRS DURATION, ECG06: 72 MS
QTC CALCULATION (BEZET), ECG08: 401 MS
VENTRICULAR RATE, ECG03: 87 BPM

## 2019-10-23 NOTE — ED NOTES
I have reviewed discharge instructions with the patient. The patient verbalized understanding. Patient ambulated out of ER w/o distress. Steady gait noted.

## 2019-10-23 NOTE — ED PROVIDER NOTES
EMERGENCY DEPARTMENT HISTORY AND PHYSICAL EXAM    9:41 PM      Date: 10/22/2019  Patient Name: Charli Riley    History of Presenting Illness     Chief Complaint   Patient presents with    Headache    Chest Pain         History Provided By: Patient    Additional History (Context): Charli Riley is a 30 y.o. female with no relevant past medical history who presents with complaint of acute onset worst headache of her life after taking Advil for menstrual cramps. She states she is on her period, which is her usual time, there are no irregularities to this, but she took Advil for her cramps shortly after this her headache started. It is a generalized, sharp headache, without associated vision changes, neck stiffness, nausea, vomiting or any other associated symptoms. She is never had a headache like this before. PCP: None    Current Facility-Administered Medications   Medication Dose Route Frequency Provider Last Rate Last Dose    sodium chloride 0.9 % bolus infusion 500 mL  500 mL IntraVENous ONCE Viridiana Domínguez  mL/hr at 10/22/19 2058 500 mL at 10/22/19 2058     Current Outpatient Medications   Medication Sig Dispense Refill    naproxen (NAPROSYN) 500 mg tablet 1 bid with food for 10 days 20 Tab 0       Past History     Past Medical History:  History reviewed. No pertinent past medical history. Past Surgical History:  Past Surgical History:   Procedure Laterality Date    HX GYN             Family History:  Family History   Problem Relation Age of Onset    Asthma Paternal Aunt     Asthma Paternal [de-identified]     Hypertension Mother     Hypertension Father        Social History:  Social History     Tobacco Use    Smoking status: Never Smoker    Smokeless tobacco: Never Used   Substance Use Topics    Alcohol use: No    Drug use: No       Allergies:   Allergies   Allergen Reactions    Pcn [Penicillins] Hives         Review of Systems       Review of Systems   Constitutional: Negative for activity change and appetite change. HENT: Negative for congestion. Eyes: Negative for visual disturbance. Respiratory: Negative for cough and shortness of breath. Cardiovascular: Negative for chest pain. Gastrointestinal: Negative for abdominal pain, diarrhea, nausea and vomiting. Genitourinary: Negative for dysuria. Musculoskeletal: Negative for arthralgias and myalgias. Skin: Negative for rash. Neurological: Positive for headaches. Negative for weakness and numbness. Physical Exam     Visit Vitals  /81   Pulse 86   Temp 99 °F (37.2 °C)   Resp 18   SpO2 100%         Physical Exam   Constitutional: She is oriented to person, place, and time. She appears well-developed and well-nourished. HENT:   Head: Normocephalic and atraumatic. Mouth/Throat: Oropharynx is clear and moist.   Eyes: Conjunctivae are normal.   Neck: Normal range of motion. Neck supple. No JVD present. Cardiovascular: Normal rate, regular rhythm, normal heart sounds and intact distal pulses. No murmur heard. Pulmonary/Chest: Effort normal and breath sounds normal.   Abdominal: Soft. Bowel sounds are normal. She exhibits no distension. There is no tenderness. Musculoskeletal: Normal range of motion. She exhibits no deformity. Lymphadenopathy:     She has no cervical adenopathy. Neurological: She is alert and oriented to person, place, and time. She has normal strength. No cranial nerve deficit or sensory deficit. Coordination normal.   Skin: Skin is warm and dry. No rash noted. Psychiatric: She has a normal mood and affect. Nursing note and vitals reviewed.         Diagnostic Study Results     Labs -  Recent Results (from the past 12 hour(s))   EKG, 12 LEAD, INITIAL    Collection Time: 10/22/19  7:21 PM   Result Value Ref Range    Ventricular Rate 87 BPM    Atrial Rate 87 BPM    P-R Interval 138 ms    QRS Duration 72 ms    Q-T Interval 334 ms    QTC Calculation (Bezet) 401 ms Calculated P Axis 44 degrees    Calculated R Axis 73 degrees    Calculated T Axis 45 degrees    Diagnosis       Normal sinus rhythm  Normal ECG  No previous ECGs available     CBC WITH AUTOMATED DIFF    Collection Time: 10/22/19  8:32 PM   Result Value Ref Range    WBC 6.5 4.6 - 13.2 K/uL    RBC 4.56 4.20 - 5.30 M/uL    HGB 12.6 12.0 - 16.0 g/dL    HCT 37.0 35.0 - 45.0 %    MCV 81.1 74.0 - 97.0 FL    MCH 27.6 24.0 - 34.0 PG    MCHC 34.1 31.0 - 37.0 g/dL    RDW 13.0 11.6 - 14.5 %    PLATELET 773 056 - 618 K/uL    MPV 9.9 9.2 - 11.8 FL    NEUTROPHILS 52 40 - 73 %    LYMPHOCYTES 32 21 - 52 %    MONOCYTES 8 3 - 10 %    EOSINOPHILS 7 (H) 0 - 5 %    BASOPHILS 1 0 - 2 %    ABS. NEUTROPHILS 3.4 1.8 - 8.0 K/UL    ABS. LYMPHOCYTES 2.0 0.9 - 3.6 K/UL    ABS. MONOCYTES 0.5 0.05 - 1.2 K/UL    ABS. EOSINOPHILS 0.5 (H) 0.0 - 0.4 K/UL    ABS. BASOPHILS 0.0 0.0 - 0.1 K/UL    DF AUTOMATED     METABOLIC PANEL, COMPREHENSIVE    Collection Time: 10/22/19  8:32 PM   Result Value Ref Range    Sodium 140 136 - 145 mmol/L    Potassium 3.6 3.5 - 5.5 mmol/L    Chloride 109 100 - 111 mmol/L    CO2 27 21 - 32 mmol/L    Anion gap 4 3.0 - 18 mmol/L    Glucose 94 74 - 99 mg/dL    BUN 10 7.0 - 18 MG/DL    Creatinine 0.70 0.6 - 1.3 MG/DL    BUN/Creatinine ratio 14 12 - 20      GFR est AA >60 >60 ml/min/1.73m2    GFR est non-AA >60 >60 ml/min/1.73m2    Calcium 8.6 8.5 - 10.1 MG/DL    Bilirubin, total PENDING MG/DL    ALT (SGPT) 36 13 - 56 U/L    AST (SGOT) 32 10 - 38 U/L    Alk. phosphatase 92 45 - 117 U/L    Protein, total 7.7 6.4 - 8.2 g/dL    Albumin 3.6 3.4 - 5.0 g/dL    Globulin 4.1 (H) 2.0 - 4.0 g/dL    A-G Ratio 0.9 0.8 - 1.7     HCG QL SERUM    Collection Time: 10/22/19  8:32 PM   Result Value Ref Range    HCG, Ql. NEGATIVE  NEG         Radiologic Studies -   CT HEAD WO CONT   Final Result   IMPRESSION:                  No acute intracranial abnormalities.                           Medical Decision Making   I am the first provider for this patient. I reviewed the vital signs, available nursing notes, past medical history, past surgical history, family history and social history. Vital Signs-Reviewed the patient's vital signs. Records Reviewed: Nursing Notes (Time of Review: 9:41 PM)      Provider Notes (Medical Decision Making):    Davina Gamble is a 30 y.o. female with no relevant past medical history who presents with complaint of acute onset worst headache of her life after taking Advil for menstrual cramps. She states she is on her period, which is her usual time, there are no irregularities to this, but she took Advil for her cramps shortly after this her headache started. It is a generalized, sharp headache, without associated vision changes, neck stiffness, nausea, vomiting or any other associated symptoms. She is never had a headache like this before. She is neurologically intact and otherwise well-appearing. Differential Diagnosis: Rule out subarachnoid hemorrhage given her presentation, however likely primary headache such as migraine, tension, or cluster headache. Given history and exam, I have low suspicion for medication related, hypertension related, stroke, cerebral venous thrombosis, cervical artery dissection, idiopathic intracranial hypertension, infection/meningitis, neoplasm, giant cell arteritis, traumatic injury, glaucoma or other primary ocular disorder. Testing: CT brain, CBC, CMP, hCG  Treatments: Compazine, Benadryl, acetaminophen    Re-evaluations:  Outpatient studies are unremarkable. Discussed at length the need to obtain a lumbar puncture to accurately rule out subarachnoid hemorrhage. The patient has been informed that subarachnoid hemorrhage is on my differential, and that I recommend lumbar puncture as is standard of care; however, they are refusing LP at this time. I believe they understand the risks of refusing this diagnostic test, and are competent to refuse.         Diagnosis     Clinical Impression:   1. Acute nonintractable headache, unspecified headache type        Disposition: Discharge    Follow-up Information    None          Patient's Medications   Start Taking    No medications on file   Continue Taking    NAPROXEN (NAPROSYN) 500 MG TABLET    1 bid with food for 10 days   These Medications have changed    No medications on file   Stop Taking    No medications on file     _______________________________    Attestations:  Isis Hurtado MD acting as a scribe for and in the presence of Feng Mena MD      October 22, 2019 at 9:44 PM       Provider Attestation:      I personally performed the services described in the documentation, reviewed the documentation, as recorded by the scribe in my presence, and it accurately and completely records my words and actions.  October 22, 2019 at 9:44 PM - Feng Mena MD    _______________________________

## 2019-10-23 NOTE — DISCHARGE INSTRUCTIONS

## 2019-10-24 ENCOUNTER — OFFICE VISIT (OUTPATIENT)
Dept: FAMILY MEDICINE CLINIC | Age: 34
End: 2019-10-24

## 2019-10-24 VITALS
TEMPERATURE: 98.5 F | HEIGHT: 59 IN | DIASTOLIC BLOOD PRESSURE: 81 MMHG | WEIGHT: 98 LBS | BODY MASS INDEX: 19.76 KG/M2 | OXYGEN SATURATION: 99 % | SYSTOLIC BLOOD PRESSURE: 125 MMHG | RESPIRATION RATE: 17 BRPM | HEART RATE: 99 BPM

## 2019-10-24 DIAGNOSIS — L60.8 TOENAIL DEFORMITY: Primary | ICD-10-CM

## 2019-10-24 NOTE — PROGRESS NOTES
Ramon Avitia presents today for   Chief Complaint   Patient presents with    Nail Problem       Is someone accompanying this pt? no    Is the patient using any DME equipment during OV? no    Depression Screening:  3 most recent PHQ Screens 3/20/2019   Little interest or pleasure in doing things Not at all   Feeling down, depressed, irritable, or hopeless Not at all   Total Score PHQ 2 0       Learning Assessment:  Learning Assessment 3/12/2018   PRIMARY LEARNER Patient   HIGHEST LEVEL OF EDUCATION - PRIMARY LEARNER  GRADUATED HIGH SCHOOL OR GED   BARRIERS PRIMARY LEARNER -   PRIMARY LANGUAGE ENGLISH   LEARNER PREFERENCE PRIMARY READING   ANSWERED BY SELF   RELATIONSHIP SELF       Abuse Screening:  Abuse Screening Questionnaire 3/12/2018   Do you ever feel afraid of your partner? Y   Are you in a relationship with someone who physically or mentally threatens you? N   Is it safe for you to go home? N       Fall Risk  No flowsheet data found. Health Maintenance reviewed and discussed and ordered per Provider. Health Maintenance Due   Topic Date Due    DTaP/Tdap/Td series (1 - Tdap) 06/15/2008    PAP AKA CERVICAL CYTOLOGY  06/15/2008    Influenza Age 9 to Adult  08/01/2019           Coordination of Care:  1. Have you been to the ER, urgent care clinic since your last visit? Hospitalized since your last visit? no    2. Have you seen or consulted any other health care providers outside of the [address] [place] [address] since your last visit? Include any pap smears or colon screening.  no

## 2019-10-24 NOTE — PROGRESS NOTES
HISTORY OF PRESENT ILLNESS  Thierno Yao is a 30 y.o. female. Nail Problem   The history is provided by the patient. This is a chronic (dropped something on her foot years ago) problem. Episode onset: 3-4 years ago. The problem has not changed since onset. Associated symptoms comments: Bilateral great toenail deformity. Nothing aggravates the symptoms. Nothing relieves the symptoms. She has tried nothing for the symptoms. Allergies   Allergen Reactions    Pcn [Penicillins] Hives     Current Outpatient Medications   Medication Sig Dispense Refill    naproxen (NAPROSYN) 500 mg tablet 1 bid with food for 10 days 20 Tab 0     No past medical history on file.   Social History     Socioeconomic History    Marital status: SINGLE     Spouse name: Not on file    Number of children: Not on file    Years of education: Not on file    Highest education level: Not on file   Occupational History    Not on file   Social Needs    Financial resource strain: Not on file    Food insecurity:     Worry: Not on file     Inability: Not on file    Transportation needs:     Medical: Not on file     Non-medical: Not on file   Tobacco Use    Smoking status: Never Smoker    Smokeless tobacco: Never Used   Substance and Sexual Activity    Alcohol use: No    Drug use: No    Sexual activity: Yes     Birth control/protection: None   Lifestyle    Physical activity:     Days per week: Not on file     Minutes per session: Not on file    Stress: Not on file   Relationships    Social connections:     Talks on phone: Not on file     Gets together: Not on file     Attends Gnosticism service: Not on file     Active member of club or organization: Not on file     Attends meetings of clubs or organizations: Not on file     Relationship status: Not on file    Intimate partner violence:     Fear of current or ex partner: Not on file     Emotionally abused: Not on file     Physically abused: Not on file     Forced sexual activity: Not on file   Other Topics Concern    Not on file   Social History Narrative    Not on file     Wt Readings from Last 3 Encounters:   10/24/19 98 lb (44.5 kg)   03/20/19 99 lb (44.9 kg)   03/12/18 104 lb 3.2 oz (47.3 kg)     BP Readings from Last 3 Encounters:   10/24/19 125/81   10/22/19 124/81   03/20/19 130/80     Review of Systems   Skin:        Toenail avulsion, bilaterally     /81   Pulse 99   Temp 98.5 °F (36.9 °C) (Oral)   Resp 17   Ht 4' 11\" (1.499 m)   Wt 98 lb (44.5 kg)   LMP 10/24/2019   SpO2 99%   Breastfeeding? No   BMI 19.79 kg/m²      Physical Exam   Constitutional: She is oriented to person, place, and time. She appears well-developed and well-nourished. HENT:   Head: Normocephalic and atraumatic. Neck: Normal range of motion. Neck supple. Cardiovascular: Normal rate, regular rhythm and normal heart sounds. Exam reveals no gallop and no friction rub. No murmur heard. Pulmonary/Chest: Effort normal and breath sounds normal. She has no wheezes. She has no rhonchi. She has no rales. Musculoskeletal:        Right foot: There is deformity (great toenail). Left foot: There is deformity (great toenail). Neurological: She is alert and oriented to person, place, and time. Skin: Skin is warm and dry. ASSESSMENT and PLAN    ICD-10-CM ICD-9-CM    1. Toenail deformity L60.8 703.9 REFERRAL TO PODIATRY     Orders Placed This Encounter    REFERRAL TO PODIATRY       I have discussed the diagnosis with the patient and the intended plan as seen in the above orders. The patient has received an after-visit summary and questions were answered concerning future plans. I have discussed medication side effects and warnings with the patient as well. Patient agreeable with above plan and verbalizes understanding. Follow-up and Dispositions    · Return if symptoms worsen or fail to improve.

## 2021-08-19 ENCOUNTER — TRANSCRIBE ORDER (OUTPATIENT)
Dept: REGISTRATION | Age: 36
End: 2021-08-19

## 2021-08-19 ENCOUNTER — HOSPITAL ENCOUNTER (OUTPATIENT)
Dept: GENERAL RADIOLOGY | Age: 36
Discharge: HOME | End: 2021-08-19
Payer: MEDICAID

## 2021-08-19 DIAGNOSIS — R07.89 OTHER CHEST PAIN: ICD-10-CM

## 2021-08-19 DIAGNOSIS — R07.89 OTHER CHEST PAIN: Primary | ICD-10-CM

## 2021-08-19 PROCEDURE — 71101 X-RAY EXAM UNILAT RIBS/CHEST: CPT

## 2022-03-24 ENCOUNTER — HOSPITAL ENCOUNTER (OUTPATIENT)
Dept: PHYSICAL THERAPY | Age: 37
Discharge: HOME | End: 2022-03-24
Payer: MEDICAID

## 2022-03-24 PROCEDURE — 97161 PT EVAL LOW COMPLEX 20 MIN: CPT
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
## 2022-03-24 NOTE — PROGRESS NOTES
PT DAILY TREATMENT NOTE/LUMBAR EVAL     Patient Name: Lawson Mccormick  Date:3/24/2022  : 1985  [x]  Patient  Verified  Payor: [facility] MEDICAID / [place] 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 Pt. Reports pain began about a year ago. Reports no known onset. Reports pain occurs randomly can just happen just sitting there or walking. X-rays were negative. Also went to ED to rule out heart/lung issues. Denies SOB. No pain with deep breathing or coughing. 10/10 at worst for pain. Topical medication helps some. Work Hx: not currently working  Living Situation: Ind with ADLs.  Playing with daughter   Pt Goals: to have less pain    OBJECTIVE/EXAMINATION      Modality rationale: decrease inflammation and decrease pain to improve the patients ability to increase ease of ADLs   Min Type Additional Details    [] Estim:  []Unatt       []IFC  []Premod                        []Other:  []w/ice   []w/heat  Position:  Location:    [] Estim: []Att    []TENS instruct  []NMES                    []Other:  []w/US   []w/ice   []w/heat  Position:  Location:    []  Traction: [] Cervical       []Lumbar                       [] Prone          []Supine                       []Intermittent   []Continuous Lbs:  [] before manual  [] after manual    []  Ultrasound: []Continuous   [] Pulsed []1MHz   []3MHz Location:  W/cm2:    []  Iontophoresis with dexamethasone         Location: [] Take home patch   [] In clinic   8 [x]  Ice     []  heat  []  Ice massage  []  Laser   []  Anodyne Position: right side lying  Location: left ribs    []  Laser with stim  []  Other: Position:  Location:    []  Vasopneumatic Device Pressure:       [] lo [] med [] hi   Temperature: [] lo [] med [] hi   [x] Skin assessment post-treatment:  [x]intact []redness- no adverse reaction    []redness  adverse reaction:     8 min Therapeutic Exercise:  [] See flow sheet : HEP   Rationale: increase ROM and increase strength to improve the patients ability to perform ADLs    5 min Manual Therapy:  KT for left rib compression    The manual therapy interventions were performed at a separate and distinct time from the therapeutic activities interventions. Rationale: decrease pain, increase ROM and increase tissue extensibility to increase ease of ADLs          With   [x] TE   [] TA   [] neuro   [] other: Patient Education: [x] Review HEP    [] Progressed/Changed HEP based on:   [] positioning   [] body mechanics   [] transfers   [] heat/ice application    [] other:      Physical Therapy Evaluation - Lumbar Spine ([facility])    OBJECTIVE  Posture:  Left ribs elevated. Side lean to right side.      Gait:  [x] Normal     [] Abnormal:    Active Movements: [] N/A   [] Too acute   [] Other:  ROM % AROM % PROM Comments:pain, area   Forward flexion 40-60 100  Pain on the way back up   Extension 20-30 75  Increased pain   SB right 20-30 75  Increased pain   SB left 20-30 75  Increased pain   Rotation right 5-10 75  Increased pain   Rotation left 5-10 75  Increased pain     Palpation  [] Min  [] Mod  [x] Severe    Location: significant pain to light touch along left mid ribs  [] Min  [] Mod  [] Severe    Location:  [] Min  [] Mod  [] Severe    Location:    Other tests/comments:  Shoulder MMT flex: right: 4+/5 left: 4-/5 abd right: 4+/5 left: 4/5  Challenged with performing diaphragmatic breathing  Pt.  Sits with lean to right side       Pain Level (0-10 scale) post treatment:  3/10    ASSESSMENT/Changes in Function:    [x]  See Plan of Care  []  See progress note/recertification  []  See Discharge Summary         Progress towards goals / Updated goals:  See POC    PLAN  []  Upgrade activities as tolerated     [x]  Continue plan of care  []  Update interventions per flow sheet       []  Discharge due to:_  []  Other:_      Kem Baeza, PT 3/24/2022  11:58 AM

## 2022-04-04 ENCOUNTER — APPOINTMENT (OUTPATIENT)
Dept: PHYSICAL THERAPY | Age: 37
End: 2022-04-04

## 2022-04-07 ENCOUNTER — APPOINTMENT (OUTPATIENT)
Dept: PHYSICAL THERAPY | Age: 37
End: 2022-04-07

## 2022-04-11 ENCOUNTER — TELEPHONE (OUTPATIENT)
Dept: PHYSICAL THERAPY | Age: 37
End: 2022-04-11

## 2022-04-14 NOTE — PROGRESS NOTES
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]  Payor: [facility] MEDICAID / Plan: [address] [place] [address] / Product Type: Managed Care Medicaid /  Onset Date: a year ago                Treatment Diagnosis: thoracic pain   Prior Hospitalization: see medical history Provider#: [id number]   Medications: Verified on Patient summary List    Comorbidities: none reported   Prior Level of Function: Ind with ADLs, Ind with ambulation, taking care of daughter           Visits from Start of Care: 1    Missed Visits: 3    Reporting Period : 3/24/22 to 22    Summary of Care:  Goal: Patient will improve FOTO score by 10 points in order to demonstrate a significant improvement in function. Status at last note/certification: 58  Status at discharge: not met    Goal: Patient will report a 50% improvement in symptoms since [facility] in order to improve quality of life. Status at last note/certification: n/a  Status at discharge: not met    Goal: Patient will improve left shoulder flex/abd MMT to 4+/5 in order to increase ease of ADLs  Status at last note/certification: flex: 4-/5 abd: 4/5  Status at discharge: not met    Goal: Patient will lift 20# with good from and no increase in pain in order to improve quality of life. Status at last note/certification: increased pain with lifting  Status at discharge: not met    Pt. Was seen for initial evaluation and then did not return to PT. Goals were unable to be re-assessed secondary to unplanned D/C.      ASSESSMENT/RECOMMENDATIONS:  [x]Discontinue therapy: []Patient has reached or is progressing toward set goals      [x]Patient is non-compliant or has abdicated      []Due to lack of appreciable progress towards set goals    Kem Adas, PT 4/14/2022 5:44 PM

## 2022-06-23 ENCOUNTER — HOSPITAL ENCOUNTER (OUTPATIENT)
Dept: GENERAL RADIOLOGY | Age: 37
Discharge: HOME | End: 2022-06-23
Payer: MEDICAID

## 2022-06-23 ENCOUNTER — TRANSCRIBE ORDER (OUTPATIENT)
Dept: REGISTRATION | Age: 37
End: 2022-06-23

## 2022-06-23 DIAGNOSIS — M54.9 BACK PAIN: Primary | ICD-10-CM

## 2022-06-23 DIAGNOSIS — M54.9 BACK PAIN: ICD-10-CM

## 2022-06-23 PROCEDURE — 72070 X-RAY EXAM THORAC SPINE 2VWS: CPT

## 2022-07-28 ENCOUNTER — HOSPITAL ENCOUNTER (OUTPATIENT)
Dept: PHYSICAL THERAPY | Age: 37
Discharge: HOME | End: 2022-07-28
Payer: MEDICAID

## 2022-07-28 PROCEDURE — 97161 PT EVAL LOW COMPLEX 20 MIN: CPT

## 2022-07-28 NOTE — THERAPY EVALUATION
PT DAILY TREATMENT NOTE/LUMBAR EVAL     Patient Name: Jalyn Luis  Date:2022  : 1985  [x]  Patient  Verified  Payor: [facility] MEDICAID / Plan: [address] [place] [address] / Product Type: Managed Care Medicaid /    In time: 8:16  Out time: 8:49  Total Treatment Time (min): 33  Visit #: 1 of     Medicare/BCBS Only   Total Timed Codes (min):  10 1:1 Treatment Time:  33     Treatment Area: Other low back pain [M54.59]  SUBJECTIVE  Pain Level (0-10 scale): 3/10  []constant []intermittent []improving []worsening []no change since onset    Any medication changes, allergies to medications, adverse drug reactions, diagnosis change, or new procedure performed?: [x] No    [] Yes (see summary sheet for update)  Subjective functional status/changes:     PLOF: ind with all mobility, Walking the mall 2 days/week, taking care of her kid at home  Limitations to PLOF:   Mechanism of Injury:   Current symptoms/Complaints: Ms. Mandi Shaver is a 31 y/o, F pt with CC of Left back & left plank pain. Pain is intermittent; no numbness/tingling and no radiating symptoms with B LEs. Pt also denies any pelvic floor problems. She recalls negative findings with X-ray.         Previous Treatment/Compliance:   PMHx/Surgical Hx:   Work Hx:   Living Situation:   Pt Goals: for the pain to leave  Barriers: []pain []financial []time []transportation []other  Motivation:   Substance use: []Alcohol []Tobacco []other:   FABQ Score: []low []elevate  Cognition: A & O x     Other:    OBJECTIVE/EXAMINATION  Domestic Life:   Activity/Recreational Limitations:   Mobility: Self Care:       23 min [x]Eval                  []Re-Eval       10 min Therapeutic Activity:  []  See flow sheet :Pt edu within scope of practice on prognosis, POC, posture, STM, pain management/modalities use, HEP   Rationale: increase ROM, increase strength, improve coordination, improve balance, and increase proprioception  to improve the patients ability to perform aDLs/amb with more ease           With   [] TE   [] TA   [] neuro   [] other: Patient Education: [x] Review HEP    [] Progressed/Changed HEP based on:   [] positioning   [] body mechanics   [] transfers   [] heat/ice application    [] other:      Other Objective/Functional Measures:     Physical Therapy Evaluation - Lumbar Spine ([facility])    SUBJECTIVE  Chief Complaint:    Mechanism of injury:    Symptoms:  Aggravated by: lifitng   [] Bending [] Sitting [] Standing [] Walking   [] Moving [] Cough [] Sneeze [] Valsalva   [] AM  [] PM  Lying:  [] sup   [] pro   [] sidelying   [] Other:     Eased by: pain med   [] Bending [] Sitting [] Standing [] Walking   [] Moving [] AM  [] PM  Lying: [] sup  [] pro  [] sidelying   [] Other:     General Health:  Red Flags Indicated? [] Yes    [x] No  [] Yes [] No Recent weight change (If yes, due to dieting?  [] Yes  [] No)   [] Yes [] No Weakness in legs during walking  [] Yes [] No Unremitting pain at night  [] Yes [] No Abdominal pain or problems  [] Yes [] No Rectal bleeding  [] Yes [] No Feet more cold or painful in cold weather  [] Yes [] No Menstrual irregularities  [] Yes [] No Blood or pain with urination  [] Yes [] No Dysfunction of bowel or bladder  [] Yes [] No Recent illness within past 3 weeks (i.e, cold, flu)  [] Yes [] No Numbness/tingling in buttock/genitalia region    Past History/Treatments:     Diagnostic Tests: [] Lab work [x] X-rays    [] CT [] MRI     [] Other:  Results: negative per pt report    Functional Status  Prior level of function:  Present functional limitations:  What position do you sleep in?:    OBJECTIVE  Posture:  Lateral Shift: [] R    [] L     [] +  [] -  Kyphosis: [] Increased [x] Decreased   []  WNL  Lordosis:  [] Increased [x] Decreased   [] WNL  Pelvic symmetry: [] WNL    [] Other:    Gait:  [] Normal     [x] Abnormal: min antalgic    Active Movements: [] N/A   [] Too acute   [x] Other: WNL but compensated with trunk flex & ext during rot & lat bending, also mod pain with rot & lat bending      ROM % AROM % PROM Comments:pain, area   Forward flexion 40-60      Extension 20-30      SB right 20-30      SB left 20-30      Rotation right 5-10      Rotation left 5-10        Neuro Screen [x] WNL Myotome & Dermatome/ of LEs  Comments:    Palpation  [] Min  [x] Mod  [x] Severe    Location: TTP along Left ribs (8-10) and QL.  [] Min  [] Mod  [] Severe    Location:  [] Min  [] Mod  [] Severe    Location:    Strength   L(0-5) R (0-5) N/T   Hip Flexion (L1,2) 5 5 []   Knee Extension (L3,4) 5 5 []   Ankle Dorsiflexion (L4) 5 5 []   Great Toe Extension (L5) 5 5 []   Ankle Plantarflexion (S1) ~4+ ~4+ []   Knee Flexion (S1,2) 5 5 []   Upper Abdominals   []   Lower Abdominals   []   Paraspinals   []   Back Rotators   []   Gluteus Rex and med 3 3 []   Other   []     Special Tests  Lumbar:  Lumb. Compression: [] Pos  [] Neg               Lumbar Distraction:   [] Pos  [] Neg    Quadrant:  [] Pos  [] Neg   [] Flex  [] Ext    Sacroilliac:  Gaenslen's: [] R    [] L    [] +    [] -     Compression: [] +    [] -     Gapping:  [] +    [] -     Thigh Thrust: [] R    [] L    [] +    [] -     Leg Length: [] +    [] -   Position:    Crests:    ASIS: level    PSIS: level    Sacral Sulcus:    Mobility: Standing flex:     Sitting flex:     Supine to sit:     Prone knee bend:         Hip: Drucella Squire:  [] R    [] L    [] +    [] -     Scour:  [] R    [] L    [] +    [] -     Piriformis: [] R    [] L    [] +    [] -          Deficits: Brii's: [] R    [] L    [] +    [] -     Dusty: [x] R    [x] L    [x] +    [] -     Hamstrings 90/90: min tightness B    Gastrocsoleus (to neutral): min tightness B Right: Left:       Global Muscular Weakness:  Abdominals:  Quadratus Lumborum:  Paraspinals:   Other:    Other tests/comments:       Pain Level (0-10 scale) post treatment: 3/10    ASSESSMENT/Changes in Function: see POC    Patient will continue to benefit from skilled PT services to modify and progress therapeutic interventions, address functional mobility deficits, address ROM deficits, address strength deficits, analyze and address soft tissue restrictions, analyze and cue movement patterns, analyze and modify body mechanics/ergonomics, assess and modify postural abnormalities, address imbalance/dizziness, and instruct in home and community integration to attain remaining goals.      [x]  See Plan of Care  []  See progress note/recertification  []  See Discharge Summary         Progress towards goals / Updated goals:  See POC    PLAN  [x]  Upgrade activities as tolerated     [x]  Continue plan of care  []  Update interventions per flow sheet       []  Discharge due to:_  []  Other:_      Ceciliolia Grain 7/28/2022  8:06 AM

## 2022-07-28 NOTE — THERAPY EVALUATION
[facility] [employer]  [address] [facility]  [phone] fax    Plan of Care/ Statement of Necessity for Physical Therapy Services    Patient name: Mary Kayser Start of Care: 2022   Referral source: Jayne Mack : 1985    Medical Diagnosis: Other low back pain [M54.59]  Payor: [facility] MEDICAID / Plan: [address] [place] [address],[facility] / Product Type: Managed Care Medicaid /  Onset LAEP:2-55-32    Treatment Diagnosis: Left back/plank pain   Prior Hospitalization: see medical history Provider#: [id number]   Medications: Verified on Patient summary List    Comorbidities: N/A   Prior Level of Function: ind with all mobility, Walking the mall 2 days/week, taking care of her kid at home     The Plan of Care and following information is based on the information from the initial evaluation. Assessment/ chaudhry information: Ms. Earnestine Morgan is a 29 y/o, F pt with CC of Left back & left plank pain. Pain is intermittent; no numbness/tingling and no radiating symptoms with B LEs. Pt also denies any pelvic floor problems. She recalls negative findings with X-ray. Pt present with [facility] with trunk AROM, TTP along Left ribs (8-10) and QL. Notes ~3 finger width diastasis recti, gap decreases gradually towards Xiphoid process. Pt demonstrates challenged with hollow tuck, min soreness and trunk rotation with Left SLR but no significant difficulty. Also demonstrates poor glute strength and min tightness of B hips. WNL with dermatomes and myotomes screening. Pt would benefit from skilled PT to address these deficits above for pain free functional mobility.     Evaluation Complexity History LOW Complexity : Zero comorbidities / personal factors that will impact the outcome / POC; Examination LOW Complexity : 1-2 Standardized tests and measures addressing body structure, function, activity limitation and / or participation in recreation  ;Presentation LOW Complexity : Stable, uncomplicated  ;Clinical Decision Making MEDIUM Complexity : FOTO score of 26-74  Overall Complexity Rating: LOW   Problem List: pain affecting function, decrease ROM, decrease strength, edema affecting function, impaired gait/ balance, decrease ADL/ functional abilitiies, decrease activity tolerance, decrease flexibility/ joint mobility, and decrease transfer abilities   Treatment Plan may include any combination of the following: Therapeutic exercise, Therapeutic activities, Neuromuscular re-education, Physical agent/modality, Gait/balance training, Manual therapy, Aquatic therapy, Patient education, Self Care training, Functional mobility training, Home safety training, and Stair training  Patient / Family readiness to learn indicated by: asking questions, trying to perform skills, and interest  Persons(s) to be included in education: patient (P)  Barriers to Learning/Limitations: None  Patient Goal (s):      4.   Patient Self Reported Health Status: good  Rehabilitation Potential: good    Short term goals: To be accomplished within 1 week  1. Pt will be independent with HEP to maintain progression. Eval status: given and reviewed HEP     Long term goals: To be accomplished within 4-6 weeks  1. Pt will improve FOTO score by 4 points to 78/100 to show improvement with functional mobility performance. Eval status: 74                2. Pt will report at least 50% improvement of overall pain to improve QOL. Eval status: 3-9/10, intermittent pain                3. Pt will report No Difficulty with Performing usual work/housework to improve her QOL. Eval status: A little bit of difficulty                4. Pt will be able to perform plank 3 ways for 45 sec to show improve core strength for safe and comfortable ADLs/amb performance. Eval status: 30 sec standard plank with min challenge/pain      Frequency / Duration: Patient to be seen 2-3 times per week for 4-6 weeks.     Patient/  Caregiver education and instruction: Diagnosis, prognosis, self care, activity modification, brace/ splint application, and exercises   [x]  Plan of care has been reviewed with PTA Thienphuc Jerlean Kayser 7/28/2022 8:05 AM    ________________________________________________________________________    I certify that the above Therapy Services are being furnished while the patient is under my care. I agree with the treatment plan and certify that this therapy is necessary.     [de-identified] Signature:____________Date:_________TIME:________     Joy Marte *  ** Signature, Date and Time must be completed for valid certification **    Please sign and return to [facility] [employer]  [address] [place] [address]  [phone] fax

## 2022-08-02 ENCOUNTER — APPOINTMENT (OUTPATIENT)
Dept: PHYSICAL THERAPY | Age: 37
End: 2022-08-02
Payer: MEDICAID

## 2022-08-04 ENCOUNTER — HOSPITAL ENCOUNTER (OUTPATIENT)
Dept: PHYSICAL THERAPY | Age: 37
Discharge: HOME | End: 2022-08-04
Payer: MEDICAID

## 2022-08-04 PROCEDURE — 97112 NEUROMUSCULAR REEDUCATION: CPT

## 2022-08-04 PROCEDURE — 97110 THERAPEUTIC EXERCISES: CPT

## 2022-08-04 PROCEDURE — 97530 THERAPEUTIC ACTIVITIES: CPT

## 2022-08-04 NOTE — PROGRESS NOTES
PT DAILY TREATMENT NOTE     Patient Name: Kaela Martinez  [id number]  : 1985  [x]  Patient  Verified  Payor: [facility] MEDICAID / Plan: [address] [place] [address] / Product Type: Managed Care Medicaid /    In time:1257  Out time:135  Total Treatment Time (min): 38  Visit #: 2 of     Medicare/BCBS Only   Total Timed Codes (min):   1:1 Treatment Time:         Treatment Area: Other low back pain [M54.59]    SUBJECTIVE  Pain Level (0-10 scale): 0  Any medication changes, allergies to medications, adverse drug reactions, diagnosis change, or new procedure performed?: [x] No    [] Yes (see summary sheet for update)  Subjective functional status/changes:   [] No changes reported  Patient denied pain upon arrival    OBJECTIVE    18 min Therapeutic Exercise:  [] See flow sheet :   Rationale: increase ROM and increase strength to improve the patients ability to perform ADLs    10 min Therapeutic Activity:  []  See flow sheet :   Rationale: increase strength and improve coordination  to improve the patients ability to perform ADLs     10 min Neuromuscular Re-education:  []  See flow sheet :   Rationale: improve coordination, improve balance, and increase proprioception  to improve the patients ability to perform ADLs          With   [] TE   [] TA   [] neuro   [] other: Patient Education: [x] Review HEP    [] Progressed/Changed HEP based on:   [] positioning   [] body mechanics   [] transfers   [] heat/ice application    [] other:      Other Objective/Functional Measures: initiated treatment per flow sheet and POC   - difficulty with TA and diaphragmatic breathing; poor body awareness      Pain Level (0-10 scale) post treatment: 0    ASSESSMENT/Changes in Function: initiated therex and treatment program per flow sheet and POC. Patient did well but had difficulty with body awareness requiring frequent VCs and tactile cues throughout.     Patient will continue to benefit from skilled PT services to modify and progress therapeutic interventions, address functional mobility deficits, address ROM deficits, address strength deficits, analyze and address soft tissue restrictions, analyze and cue movement patterns, and analyze and modify body mechanics/ergonomics to attain remaining goals. [x]  See Plan of Care  []  See progress note/recertification  []  See Discharge Summary         Progress towards goals / Updated goals:  Short term goals: To be accomplished within 1 week  1. Pt will be independent with HEP to maintain progression. Eval status: given and reviewed HEP     Long term goals: To be accomplished within 4-6 weeks  1. Pt will improve FOTO score by 4 points to 78/100 to show improvement with functional mobility performance. Eval status: 74                2. Pt will report at least 50% improvement of overall pain to improve QOL. Eval status: 3-9/10, intermittent pain                3. Pt will report No Difficulty with Performing usual work/housework to improve her QOL. Eval status: A little bit of difficulty                4. Pt will be able to perform plank 3 ways for 45 sec to show improve core strength for safe and comfortable ADLs/amb performance.   Eval status: 30 sec standard plank with min challenge/pain       PLAN  [x]  Upgrade activities as tolerated     [x]  Continue plan of care  []  Update interventions per flow sheet       []  Discharge due to:_  []  Other:_      Ivett Thorpe PTA 8/4/2022  1:00 PM    Future Appointments   Date Time Provider Pankaj Robert   8/9/2022 10:30 AM Estela Ambrocio PTA [facility]   8/11/2022  9:00 AM Sandra Duran [facility]   8/16/2022  9:45 AM Estela Ambrocio PTA [facility]   8/18/2022  9:45 AM Estela Ambrocio PTA [facility]   8/23/2022 10:30 AM Bryanbryce Bonilla [facility]   8/25/2022 11:15 AM Estela Ambrocio PTA [facility]   8/30/2022 10:30 AM Bryan Conine [facility]   9/1/2022  9:00 AM [facility]

## 2022-08-09 ENCOUNTER — HOSPITAL ENCOUNTER (OUTPATIENT)
Dept: PHYSICAL THERAPY | Age: 37
Discharge: HOME | End: 2022-08-09
Payer: MEDICAID

## 2022-08-09 PROCEDURE — 97110 THERAPEUTIC EXERCISES: CPT

## 2022-08-09 PROCEDURE — 97112 NEUROMUSCULAR REEDUCATION: CPT

## 2022-08-09 PROCEDURE — 97530 THERAPEUTIC ACTIVITIES: CPT

## 2022-08-09 NOTE — PROGRESS NOTES
PT DAILY TREATMENT NOTE     Patient Name: Enrike Maloney  [id number]  : 1985  [x]  Patient  Verified  Payor: [facility] MEDICAID / Plan: [address] / Product Type: Managed Care Medicaid /    In time:1035  Out time:1112  Total Treatment Time (min): 37  Visit #: 3 of       Treatment Area: Other low back pain [M54.59]    SUBJECTIVE  Pain Level (0-10 scale): 0  Any medication changes, allergies to medications, adverse drug reactions, diagnosis change, or new procedure performed?: [x] No    [] Yes (see summary sheet for update)  Subjective functional status/changes:   [] No changes reported  Patient reports adherence to HEP \"every other day\" due to being busy taking care of her daughter. OBJECTIVE    17 min Therapeutic Exercise:  [x] See flow sheet : increased time under tension and added load. Rationale: increase ROM and increase strength to improve the patients ability to complete ADLs with ease. 8 min Therapeutic Activity:  [x]  See flow sheet :   Rationale: increase strength, improve coordination, and increase proprioception  to improve the patients ability to complete functional activities with ease. 12 min Neuromuscular Re-education:  [x]  See flow sheet : diaphragm and TA re-education, including VC/TC to improve core engagement and neuromuscular sequencing. Rationale: increase ROM, increase strength, improve coordination, improve balance, and increase proprioception  to improve the patients ability to improve biomechanics for ease of ADL completion. With   [x] TE   [] TA   [] neuro   [] other: Patient Education: [x] Review HEP    [] Progressed/Changed HEP based on:   [] positioning   [] body mechanics   [] transfers   [] heat/ice application    [x] other: Increase adherence to HEP. Patient educated on improved therapeutic outcomes with increased adherence to HEP. She verbalized understanding and that she would increase adherence.       Other Objective/Functional Measures:   Patient requires moderate VC/TC to improve diaphragmatic awareness. Use of B hand placement at abdomen and sternum improved return demonstration during diaphragmatic breathing. Initiated planking (prone on toes, 1 x 45\"). Pain Level (0-10 scale) post treatment: 0    ASSESSMENT/Changes in Function: Today's exercises focused on increased core and trunk mobility, strength, and proprioceptive awareness, with patient reporting no adverse responses to increased time under tension and load. Continue to progress POC. Patient will continue to benefit from skilled PT services to modify and progress therapeutic interventions, address functional mobility deficits, address ROM deficits, address strength deficits, analyze and address soft tissue restrictions, analyze and cue movement patterns, analyze and modify body mechanics/ergonomics, assess and modify postural abnormalities, address imbalance/dizziness, and instruct in home and community integration to attain remaining goals. [x]  See Plan of Care  []  See progress note/recertification  []  See Discharge Summary         Progress towards goals / Updated goals:  Short term goals: To be accomplished within 1 week  1. Pt will be independent with HEP to maintain progression. Eval status: given and reviewed HEP  Patient reports completion of Hep every other day. (8/9/22)     Long term goals: To be accomplished within 4-6 weeks  1. Pt will improve FOTO score by 4 points to 78/100 to show improvement with functional mobility performance. Eval status: 74                2. Pt will report at least 50% improvement of overall pain to improve QOL. Eval status: 3-9/10, intermittent pain                3. Pt will report No Difficulty with Performing usual work/housework to improve her QOL.   Eval status: A little bit of difficulty                4. Pt will be able to perform plank 3 ways for 45 sec to show improve core strength for safe and comfortable ADLs/amb performance.   Eval status: 30 sec standard plank with min challenge/pain  Patient completed 1 prone plank for 45\" today. (8/9/22)    PLAN  [x]  Upgrade activities as tolerated     []  Continue plan of care  []  Update interventions per flow sheet       []  Discharge due to:_  []  Other:_      Daniela Gallo, MEENAKSHI 8/9/2022  8:39 AM    Future Appointments   Date Time Provider Pankaj Robert   8/9/2022 10:30 AM Doris Gutierrez, PTA [facility]   8/11/2022  9:00 AM Lucien Guevara [facility]   8/16/2022  9:45 AM Aranza Leach PTA [facility]   8/18/2022  9:45 AM Aranza Leach PTA [facility]   8/23/2022 10:30 AM Prince Delong [facility]   8/25/2022 11:15 AM Aranza Leach, PTA [facility]   8/30/2022 10:30 AM Prince Delong [facility]   9/1/2022  9:00 AM Aranza Leach PTA [facility]

## 2022-08-10 ENCOUNTER — APPOINTMENT (OUTPATIENT)
Dept: PHYSICAL THERAPY | Age: 37
End: 2022-08-10
Payer: MEDICAID

## 2022-08-11 ENCOUNTER — HOSPITAL ENCOUNTER (OUTPATIENT)
Dept: PHYSICAL THERAPY | Age: 37
Discharge: HOME | End: 2022-08-11
Payer: MEDICAID

## 2022-08-11 PROCEDURE — 97112 NEUROMUSCULAR REEDUCATION: CPT

## 2022-08-11 PROCEDURE — 97530 THERAPEUTIC ACTIVITIES: CPT

## 2022-08-11 PROCEDURE — 97110 THERAPEUTIC EXERCISES: CPT

## 2022-08-11 NOTE — PROGRESS NOTES
PT DAILY TREATMENT NOTE     Patient Name: Jos De La Rosa  Date:2022  : 1985  [x]  Patient  Verified  Payor: [facility] MEDICAID / Plan: [address] [place] [address] / Product Type: Managed Care Medicaid /    In time:8:48  Out time:9:34  Total Treatment Time (min): 46  Visit #: 4 of       Treatment Area: Other low back pain [M54.59]    SUBJECTIVE  Pain Level (0-10 scale): 4  Any medication changes, allergies to medications, adverse drug reactions, diagnosis change, or new procedure performed?: [x] No    [] Yes (see summary sheet for update)  Subjective functional status/changes:   [] No changes reported  Pt reports having pain on her left side    OBJECTIVE    23 min Therapeutic Exercise:  [x] See flow sheet :   Rationale: increase ROM and increase strength to improve the patients ability to perform ADLs    8 min Therapeutic Activity:  [x]  See flow sheet :   Rationale: increase strength, improve coordination, and increase proprioception  to improve the patients ability to increase ease with daily tasks     15 min Neuromuscular Re-education:  [x]  See flow sheet :   Rationale: increase strength, improve coordination, and increase proprioception  to improve the patients ability to perform functional tasks              With   [] TE   [] TA   [] neuro   [] other: Patient Education: [x] Review HEP    [] Progressed/Changed HEP based on:   [] positioning   [] body mechanics   [] transfers   [] heat/ice application    [] other:      Other Objective/Functional Measures:    Incr'd reps per flow sheet    Pain Level (0-10 scale) post treatment: 0    ASSESSMENT/Changes in Function:Max postural cues with KZ exercises. Pt was challenged with incr'd reps, reports ms fatigue with SLR flex and abd. Decr'd pain following treatment today.      Patient will continue to benefit from skilled PT services to modify and progress therapeutic interventions, address functional mobility deficits, address ROM deficits, address strength deficits, analyze and address soft tissue restrictions, analyze and cue movement patterns, and analyze and modify body mechanics/ergonomics to attain remaining goals. []  See Plan of Care  []  See progress note/recertification  []  See Discharge Summary         Progress towards goals / Updated goals:  Short term goals: To be accomplished within 1 week  1. Pt will be independent with HEP to maintain progression. Eval status: given and reviewed HEP  Patient reports completion of Hep every other day. (8/9/22)     Long term goals: To be accomplished within 4-6 weeks  1. Pt will improve FOTO score by 4 points to 78/100 to show improvement with functional mobility performance. Eval status: 74                2. Pt will report at least 50% improvement of overall pain to improve QOL. Eval status: 3-9/10, intermittent pain  Current: 4/10 pain today 8/11/2022                3. Pt will report No Difficulty with Performing usual work/housework to improve her QOL. Eval status: A little bit of difficulty                4. Pt will be able to perform plank 3 ways for 45 sec to show improve core strength for safe and comfortable ADLs/amb performance.   Eval status: 30 sec standard plank with min challenge/pain  Patient completed 1 prone plank for 45\" today. (8/9/22)    PLAN  []  Upgrade activities as tolerated     [x]  Continue plan of care  []  Update interventions per flow sheet       []  Discharge due to:_  []  Other:_      Ronald Smith PTA 8/11/2022  8:50 AM    Future Appointments   Date Time Provider Pankaj Robert   8/11/2022  9:00 AM Lucien Glassing [facility]   8/16/2022  9:45 AM Aranza Leach PTA [facility]   8/18/2022  9:45 AM Aranza Leach PTA [facility]   8/23/2022 10:30 AM Harl Delong [facility]   8/25/2022 11:15 AM Harl Delong [facility]   8/30/2022 10:30 AM Harl Delong [facility]   9/1/2022  9:00 AM Aranza Leach PTA [facility]

## 2022-08-16 ENCOUNTER — HOSPITAL ENCOUNTER (OUTPATIENT)
Dept: PHYSICAL THERAPY | Age: 37
End: 2022-08-16
Payer: MEDICAID

## 2022-08-18 ENCOUNTER — HOSPITAL ENCOUNTER (OUTPATIENT)
Dept: PHYSICAL THERAPY | Age: 37
Discharge: HOME | End: 2022-08-18
Payer: MEDICAID

## 2022-08-18 PROCEDURE — 97110 THERAPEUTIC EXERCISES: CPT

## 2022-08-18 PROCEDURE — 97112 NEUROMUSCULAR REEDUCATION: CPT

## 2022-08-18 PROCEDURE — 97530 THERAPEUTIC ACTIVITIES: CPT

## 2022-08-18 NOTE — PROGRESS NOTES
PT DAILY TREATMENT NOTE     Patient Name: Becka Pizarro  XQPU:  : 1985  [x]  Patient  Verified  Payor: [facility] MEDICAID / Plan: [address] [place] [address] / Product Type: Managed Care Medicaid /    In time: 9:48  Out time: 10:33  Total Treatment Time (min): 45  Visit #: 5 of -12      Treatment Area: Other low back pain [M54.59]    SUBJECTIVE  Pain Level (0-10 scale): 0/10  Any medication changes, allergies to medications, adverse drug reactions, diagnosis change, or new procedure performed?: [x] No    [] Yes (see summary sheet for update)  Subjective functional status/changes:   [] No changes reported  Pt reports no changes since last visit, she still gets pain.      OBJECTIVE    20 min Therapeutic Exercise:  [x] See flow sheet :   Rationale: increase ROM and increase strength to improve the patients ability to perform ADLs    15 min Therapeutic Activity:  [x]  See flow sheet :   Rationale: increase strength, improve coordination, and increase proprioception  to improve the patients ability to increase ease with daily tasks     10 min Neuromuscular Re-education:  [x]  See flow sheet :   Rationale: increase strength, improve coordination, and increase proprioception  to improve the patients ability to perform functional tasks          With   [] TE   [] TA   [] neuro   [] other: Patient Education: [x] Review HEP    [] Progressed/Changed HEP based on:   [] positioning   [] body mechanics   [] transfers   [] heat/ice application    [] other:      Other Objective/Functional Measures:   - demo and cuing for all therex   - challenged with GTB bridges  - challenged with maintaining QS with SLRs, c/o feeling it in her thighs  - TCs for S/L hip abduction, c/o feeling it in hip/glutes  - challenged perform quadruped LE lifts without hip rotation, L>R    Pain Level (0-10 scale) post treatment: 0/10 \"tight\"    ASSESSMENT/Changes in Function:   Patient demonstrates fair tolerance to program requiring demo and cuing for all therex. Noted overall global muscle weakness and decreased core strength. Moderate shoulder fatigue with quadruped activities. Plan to add SB deadbugs to further core strength NV. Patient will continue to benefit from skilled PT services to modify and progress therapeutic interventions, address functional mobility deficits, address ROM deficits, address strength deficits, analyze and address soft tissue restrictions, analyze and cue movement patterns, and analyze and modify body mechanics/ergonomics to attain remaining goals. []  See Plan of Care  []  See progress note/recertification  []  See Discharge Summary         Progress towards goals / Updated goals:  Short term goals: To be accomplished within 1 week  1. Pt will be independent with HEP to maintain progression. Eval status: given and reviewed HEP  Patient reports completion of Hep every other day. (8/9/22)     Long term goals: To be accomplished within 4-6 weeks  1. Pt will improve FOTO score by 4 points to 78/100 to show improvement with functional mobility performance. Eval status: 74                2. Pt will report at least 50% improvement of overall pain to improve QOL. Eval status: 3-9/10, intermittent pain  Current: 4/10 pain today 8/11/2022                3. Pt will report No Difficulty with Performing usual work/housework to improve her QOL. Eval status: A little bit of difficulty                4. Pt will be able to perform plank 3 ways for 45 sec to show improve core strength for safe and comfortable ADLs/amb performance.   Eval status: 30 sec standard plank with min challenge/pain  Patient completed 1 prone plank for 45\" today. (8/9/22)    PLAN  []  Upgrade activities as tolerated     [x]  Continue plan of care  []  Update interventions per flow sheet       []  Discharge due to:_  [x]  Other: plan to add [address] [facility], PTA 8/18/2022  10:33 AM    Future Appointments   Date Time Provider Pankaj Yesica   8/18/2022  9:45 AM Linden Kumar PTA [facility]   8/23/2022 10:30 AM Elio Shah [facility]   8/25/2022 11:15 AM Elio Shah [facility]   8/30/2022 10:30 AM Elio Shah [facility]   9/1/2022  9:00 AM Hayden Garcia PTA [facility]

## 2022-08-23 ENCOUNTER — HOSPITAL ENCOUNTER (OUTPATIENT)
Dept: PHYSICAL THERAPY | Age: 37
Discharge: HOME | End: 2022-08-23
Payer: MEDICAID

## 2022-08-23 PROCEDURE — 97112 NEUROMUSCULAR REEDUCATION: CPT

## 2022-08-23 PROCEDURE — 97530 THERAPEUTIC ACTIVITIES: CPT

## 2022-08-23 PROCEDURE — 97110 THERAPEUTIC EXERCISES: CPT

## 2022-08-23 NOTE — PROGRESS NOTES
PT DAILY TREATMENT NOTE     Patient Name: Mary Kayser  Date:2022  : 1985  [x]  Patient  Verified  Payor: [facility] MEDICAID / Plan: [address] [place] [address] / Product Type: Managed Care Medicaid /    In time: 10:34  Out time: 11:12  Total Treatment Time (min): 38  Visit #: 6 of       Treatment Area: Other low back pain [M54.59]    SUBJECTIVE  Pain Level (0-10 scale): 0/10  Any medication changes, allergies to medications, adverse drug reactions, diagnosis change, or new procedure performed?: [x] No    [] Yes (see summary sheet for update)  Subjective functional status/changes:   [] No changes reported  Pt reports doing ok, still having hard time with engaging TA.      OBJECTIVE      15 min Therapeutic Exercise:  [x] See flow sheet :   Rationale: increase ROM and increase strength to improve the patients ability to perform ADLs & amb with more ease    8 min Therapeutic Activity:  [x]  See flow sheet : diaphragmatic release,    Rationale: increase ROM, increase strength, improve coordination, improve balance, and increase proprioception  to improve the patients ability to perform ADLs/transfer with more ease     15 min Neuromuscular Re-education:  [x]  See flow sheet :   Rationale: improve coordination, improve balance, and increase proprioception  to improve the patients ability to perform ADLs & amb with ease & safety          With   [] TE   [] TA   [] neuro   [] other: Patient Education: [x] Review HEP    [] Progressed/Changed HEP based on:   [] positioning   [] body mechanics   [] transfers   [] heat/ice application    [] other:      Other Objective/Functional Measures:    More challenged with maintaining TA with Left heel slide    1 UE for al SB therex   Hiking shoulders with rowing & press out   No pain with therex   Required verbal & tactile cuing for most therex    Pain Level (0-10 scale) post treatment: 0/10    ASSESSMENT/Changes in Function: Pt's making gradual progress with improving pain and coordination with TA. She cont to demonstrate poor core & upper back strength. Will progress as tolerated. Patient will continue to benefit from skilled PT services to modify and progress therapeutic interventions, address functional mobility deficits, address ROM deficits, address strength deficits, analyze and address soft tissue restrictions, analyze and cue movement patterns, analyze and modify body mechanics/ergonomics, assess and modify postural abnormalities, address imbalance/dizziness, and instruct in home and community integration to attain remaining goals. [x]  See Plan of Care  []  See progress note/recertification  []  See Discharge Summary         Progress towards goals / Updated goals:  Short term goals: To be accomplished within 1 week  1. Pt will be independent with HEP to maintain progression. Eval status: given and reviewed HEP  Patient reports completion of Hep every other day. (8/9/22)     Long term goals: To be accomplished within 4-6 weeks  1. Pt will improve FOTO score by 4 points to 78/100 to show improvement with functional mobility performance. Eval status: 74                2. Pt will report at least 50% improvement of overall pain to improve QOL. Eval status: 3-9/10, intermittent pain  Current: 4/10 pain today 8/11/2022                3. Pt will report No Difficulty with Performing usual work/housework to improve her QOL. Eval status: A little bit of difficulty                4. Pt will be able to perform plank 3 ways for 45 sec to show improve core strength for safe and comfortable ADLs/amb performance.   Eval status: 30 sec standard plank with min challenge/pain  Patient completed 1 prone plank for 45\" today. (8/9/22)      PLAN  [x]  Upgrade activities as tolerated     [x]  Continue plan of care  []  Update interventions per flow sheet       []  Discharge due to:_  []  Other:_      Kassandra Chambers 8/23/2022  9:29 AM    Future Appointments   Date Time Provider Pankaj Robert   8/23/2022 10:30 AM Fabio Burns [facility]   8/25/2022 11:15 AM Fabio Burns [facility]   8/30/2022 10:30 AM Fabio Burns [facility]   9/1/2022  9:00 AM Shanice Schmidt PTA [facility]

## 2022-08-25 ENCOUNTER — APPOINTMENT (OUTPATIENT)
Dept: PHYSICAL THERAPY | Age: 37
End: 2022-08-25
Payer: MEDICAID

## 2022-08-30 ENCOUNTER — HOSPITAL ENCOUNTER (OUTPATIENT)
Dept: PHYSICAL THERAPY | Age: 37
Discharge: HOME | End: 2022-08-30
Payer: MEDICAID

## 2022-08-30 PROCEDURE — 97112 NEUROMUSCULAR REEDUCATION: CPT

## 2022-08-30 PROCEDURE — 97110 THERAPEUTIC EXERCISES: CPT

## 2022-08-30 PROCEDURE — 97530 THERAPEUTIC ACTIVITIES: CPT

## 2022-08-30 NOTE — PROGRESS NOTES
PT DISCHARGE DAILY NOTE AND [id number]    Date:2022  Patient name: Jos De La Rosa Start of Care: 2022   Referral source: Jaycee Portillo * : 1985                Medical Diagnosis: Other low back pain [M54.59]  Payor: [facility] MEDICAID / Plan: [address] [place] [address],[facility] / Product Type: Managed Care Medicaid /  Onset SHTU:[id number]                Treatment Diagnosis: Left back/plank pain   Prior Hospitalization: see medical history Provider#: [id number]   Medications: Verified on Patient summary List    Comorbidities: N/A   Prior Level of Function: ind with all mobility, Walking the mall 2 days/week, taking care of her kid at home     Visits from [facility]: 7    Missed Visits: 0    Reporting Period : 2022 to 2022    [x]  Patient  Verified  Payor: [facility] MEDICAID / Plan: [address] [place] [address] / Product Type: Managed Care Medicaid /    In time:10:30  Out time:11:07  Total Treatment Time (min): 37  Visit #: 7 of     SUBJECTIVE  Pain Level (0-10 scale): 0/10 back, 4/10 right shoulder  Any medication changes, allergies to medications, adverse drug reactions, diagnosis change, or new procedure performed?: [x] No    [] Yes (see summary sheet for update)  Subjective functional status/changes:   [] No changes reported  Pt reports 70% improvement, no difficulty with all ADLs/house chores. She's confident with cont exercises at home to improve her strength.       OBJECTIVE    10 min Therapeutic Exercise:  [x] See flow sheet :   Rationale: increase ROM and increase strength to improve the patients ability to perform ADLs/amb with more ease     17 min Therapeutic Activity:  [x]  See flow sheet : FOTO re-assessment, HEP progression   Rationale: increase ROM, increase strength, improve coordination, improve balance, and increase proprioception  to improve the patients ability to perform ADLs/amb with ease & safety     10 min Neuromuscular Re-education:  [x]  See flow sheet : Rationale: improve coordination, improve balance, and increase proprioception  to improve the patients ability to perform ADLs/amb with ease & safety             With   [] TE   [] TA   [] neuro   [] other: Patient Education: [x] Review HEP    [] Progressed/Changed HEP based on:   [] positioning   [] body mechanics   [] transfers   [] heat/ice application    [] other:      Other Objective/Functional Measures: FOTO: 83   Unable to tolerate SL bridge   Mod challenged with maintaining TA in standing with pressing out using KZ     Pain Level (0-10 scale) post treatment: 0/10 back    Summary of Care:  Short term goals: To be accomplished within 1 week  1. Pt will be independent with HEP to maintain progression. Eval status: given and reviewed HEP  Patient reports completion of Hep every other day. (8/9/22); met 8-30-22     Long term goals: To be accomplished within 4-6 weeks  1. Pt will improve FOTO score by 4 points to 78/100 to show improvement with functional mobility performance. Eval status: 74  Current: met 83/10081008-30-22                2. Pt will report at least 50% improvement of overall pain to improve QOL. Eval status: 3-9/10, intermittent pain  Current: 4/10 pain today 8/11/2022; met 0-3/10 8-30-22                3. Pt will report No Difficulty with Performing usual work/housework to improve her QOL. Eval status: A little bit of difficulty  Current: met 8-30-22                4. Pt will be able to perform plank 3 ways for 45 sec to show improve core strength for safe and comfortable ADLs/amb performance. Eval status: 30 sec standard plank with min challenge/pain  Patient completed 1 prone plank for 45\" today. (8/9/22); unable to tolerate side plank due to shoulder pain 8-30-22  Status at discharge: met    ASSESSMENT/Changes in Function:  Pt met all of her established goals and reported no difficulty with ADLs/amb.  She cont to be challenged with core strengthening therex due to coordination, decreased strength and decreased endurance. Pt's confident with cont HEP to improve these remained limitations to achieve pain free mobility.       Thank you for this referral!     PLAN  [x]Discontinue therapy: [x]Patient has reached or is progressing toward set goals      []Patient is non-compliant or has abdicated      []Due to lack of appreciable progress towards set goals    Holly Camarena 8/30/2022  11:11 AM

## 2022-09-01 ENCOUNTER — APPOINTMENT (OUTPATIENT)
Dept: PHYSICAL THERAPY | Age: 37
End: 2022-09-01